# Patient Record
Sex: FEMALE | Race: WHITE | Employment: FULL TIME | URBAN - METROPOLITAN AREA
[De-identification: names, ages, dates, MRNs, and addresses within clinical notes are randomized per-mention and may not be internally consistent; named-entity substitution may affect disease eponyms.]

---

## 2017-06-02 ENCOUNTER — HOSPITAL ENCOUNTER (OUTPATIENT)
Dept: MRI IMAGING | Age: 52
Discharge: HOME | End: 2017-06-02
Attending: FAMILY MEDICINE
Payer: COMMERCIAL

## 2017-06-02 DIAGNOSIS — M54.9 BACK PAIN: ICD-10-CM

## 2017-06-02 PROCEDURE — 72148 MRI LUMBAR SPINE W/O DYE: CPT

## 2017-06-19 ENCOUNTER — OFFICE VISIT (OUTPATIENT)
Dept: OBGYN CLINIC | Age: 52
End: 2017-06-19

## 2017-06-19 VITALS — WEIGHT: 138 LBS | SYSTOLIC BLOOD PRESSURE: 118 MMHG | DIASTOLIC BLOOD PRESSURE: 76 MMHG | BODY MASS INDEX: 22.96 KG/M2

## 2017-06-19 DIAGNOSIS — D25.0 SUBMUCOUS LEIOMYOMA OF UTERUS: Primary | ICD-10-CM

## 2017-06-19 RX ORDER — VALACYCLOVIR HYDROCHLORIDE 500 MG/1
TABLET, FILM COATED ORAL 2 TIMES DAILY
COMMUNITY
End: 2021-01-06 | Stop reason: ALTCHOICE

## 2017-06-19 RX ORDER — ZOLPIDEM TARTRATE 10 MG/1
TABLET ORAL
COMMUNITY
End: 2021-01-06 | Stop reason: ALTCHOICE

## 2017-06-19 RX ORDER — CHOLECALCIFEROL (VITAMIN D3) 125 MCG
CAPSULE ORAL
COMMUNITY
End: 2019-10-03

## 2017-06-19 RX ORDER — RIZATRIPTAN BENZOATE 10 MG/1
10 TABLET ORAL
COMMUNITY
End: 2021-01-06 | Stop reason: ALTCHOICE

## 2017-06-19 RX ORDER — DOCUSATE SODIUM 100 MG/1
100 CAPSULE, LIQUID FILLED ORAL 2 TIMES DAILY
COMMUNITY
End: 2019-10-03

## 2017-06-19 NOTE — PATIENT INSTRUCTIONS
Uterine Fibroids: Care Instructions  Your Care Instructions    Uterine fibroids are growths in the uterus. Fibroids aren't cancer. Doctors don't know what causes fibroids. Fibroids are very common in women during their childbearing years. Fibroids can grow on the inside of the uterus, in the muscle wall of the uterus, or near the outside wall of the uterus. In some women, fibroids cause painful cramps and heavy periods. In these cases, taking anti-inflammatory medicines, birth control pills, or using an intrauterine device (IUD) often helps decrease symptoms. Sometimes surgery is needed to treat fibroids. But if you are near menopause, you may want to wait and see if your symptoms get better. Most fibroids shrink and go away after menopause, when your menstrual periods stop completely. Follow-up care is a key part of your treatment and safety. Be sure to make and go to all appointments, and call your doctor if you are having problems. It's also a good idea to know your test results and keep a list of the medicines you take. How can you care for yourself at home? · If your doctor gave you medicine, take it as exactly as prescribed. Call your doctor if you think you are having a problem with your medicine. · Take anti-inflammatory medicines for pain. These include ibuprofen (Advil, Motrin) and naproxen (Aleve). Read and follow all instructions on the label. · Use heat, such as a hot water bottle or a heating pad set on low, or a warm bath to relax tense muscles and relieve cramping. Put a thin cloth between the heating pad and your skin. Never go to sleep with a heating pad on. · Lie down and put a pillow under your knees. Or, lie on your side and bring your knees up to your chest. These positions may help relieve belly pain or pressure. · Keep track of how many sanitary pads or tampons you use each day. · Get at least 30 minutes of exercise on most days of the week. Walking is a good choice.  You also may want to do other activities, such as running, swimming, cycling, or playing tennis or team sports. · If you bleed longer than usual or have heavy bleeding, take a daily multivitamin with iron. When should you call for help? Call 911 anytime you think you may need emergency care. For example, call if:  · You passed out (lost consciousness). · You have sudden, severe pain in your belly or pelvis. Call your doctor now or seek immediate medical care if:  · You have severe vaginal bleeding. This means that you are soaking through your usual pads each hour for 2 or more hours. · You have new belly or pelvic pain. · You are dizzy or lightheaded, or you feel like you may faint. · You have new or unexpected vaginal bleeding. Watch closely for changes in your health, and be sure to contact your doctor if:  · You have new vaginal discharge. · You have ongoing heavy or irregular vaginal bleeding. · You have pelvic pain or a heavy feeling in your lower belly that doesn't go away. · You have to urinate often. · You are more constipated than usual.  Where can you learn more? Go to http://yomi-rosalino.info/. Enter B121 in the search box to learn more about \"Uterine Fibroids: Care Instructions. \"  Current as of: October 13, 2016  Content Version: 11.2  © 9468-2844 [employer]. Care instructions adapted under license by [employer] (which disclaims liability or warranty for this information). If you have questions about a medical condition or this instruction, always ask your healthcare professional. [place] [address] any warranty or liability for your use of this information.

## 2017-06-19 NOTE — PROGRESS NOTES
Sarah Ardon is a 46 y.o. female who complains of back pain and irregular bleeding. Patient states she is concerned the fibroids she has are causing her back pain. Considered possible Hysteroscopy, D&C with Novassure endometrial ablation last year. US 2016 w multiple fibroids; one 2.5cm submucosal and thickened endometrial lining with benign pipelle    Patient's major challenge at this point is back pain; she has seen several ortho doctors and is wondering if the fibroids could be the source of her discomfort. The back pain has significantly worsened over the past 6m but not the bleeding or cramping. She still is having a managable monthly bleed. She and I have thoroughly reviewed her current situation. I have offered her Hysteroscopy, D&C with Novassure endometrial ablation but she wants to have her back pain reviewed by another neurosurgeon. We discussed her hx of breast cancer; she has had a recent evaluation showing no evidence of recurrence. She developed this problem approximately 6 months ago. Her relevant past medical history:   Past Medical History:   Diagnosis Date    Breast cancer ([facility] [address] 2009    Cancer [facility])     left breast cancer    Radiation therapy complication         Past Surgical History:   Procedure Laterality Date    HX BREAST LUMPECTOMY Left     HX GYN          MN MASTECTOMY, PARTIAL       Social History     Occupational History    Not on file.      Social History Main Topics    Smoking status: Never Smoker    Smokeless tobacco: Not on file    Alcohol use Yes      Comment: sometimes    Drug use: Not on file    Sexual activity: Not on file     Family History   Problem Relation Age of Onset    Cancer Father      Lung    Cancer Other      father history of colon polyps    Breast Cancer Other     Breast Cancer Maternal Aunt        Allergies   Allergen Reactions    Penicillin G Other (comments)     Patient stated erythema,bruising     Prior to Admission medications    Medication Sig Start Date End Date Taking? Authorizing Provider   zolpidem (AMBIEN) 10 mg tablet Take  by mouth nightly as needed for Sleep. Yes Historical Provider   naproxen sodium 220 mg cap Take  by mouth. Yes Historical Provider   docusate sodium (COLACE) 100 mg capsule Take 100 mg by mouth two (2) times a day. Yes Historical Provider   loratadine 10 mg cap Take  by mouth. Yes Historical Provider   valACYclovir (VALTREX) 500 mg tablet Take  by mouth two (2) times a day. Yes Historical Provider   rizatriptan (MAXALT) 10 mg tablet Take 10 mg by mouth once as needed for Migraine. May repeat in 2 hours if needed   Yes Historical Provider   cholecalciferol, vitamin D3, (VITAMIN D3) 2,000 unit tab Take 2,000 Units by mouth daily. Yes Historical Provider   ibuprofen (MOTRIN) 400 mg tablet Take 400 mg by mouth nightly. Historical Provider   OTHER Take 10 mg by mouth daily.  Indications: Losartan    Historical Provider        Review of Systems - History obtained from the patient  Constitutional: negative for weight loss, fever, night sweats  HEENT: negative for hearing loss, earache, congestion, snoring, sorethroat  CV: negative for chest pain, palpitations, edema  Resp: negative for cough, shortness of breath, wheezing  Breast: negative for breast lumps, nipple discharge, galactorrhea  GI: negative for change in bowel habits, abdominal pain, black or bloody stools  : negative for frequency, dysuria, hematuria  MSK: negative for back pain, joint pain, muscle pain  Skin: negative for itching, rash, hives  Neuro: negative for dizziness, headache, confusion, weakness  Psych: negative for anxiety, depression, change in mood  Heme/lymph: negative for bleeding, bruising, pallor      Objective:  Visit Vitals    /76    Wt 138 lb (62.6 kg)    LMP 06/09/2017 (Exact Date)    BMI 22.96 kg/m2          PHYSICAL EXAMINATION    Constitutional  · Appearance: well-nourished, well developed, alert, in no acute distress    HENT  · Head and Face: appears normal    ·    ·      Genitourinary  · deferred    Skin  · General Inspection: no rash, no lesions identified    Neurologic/Psychiatric  · Mental Status:  · Orientation: grossly oriented to person, place and time  · Mood and Affect: mood normal, affect appropriate    Assessment:    progressively worsening back pain of unclear etiology  Fibroids - only minimal increase in size noted on recent study 12/2016  Heavy menses; negative pipelle 12/2016  Hx breast cancer; 2009; no evidence of recurrence  Plan:    At this point, patient to pursue 2nd opinion regarding her back w physician in [place]  Will update me if she wants to pursue Hysteroscopy, D&C with Novassure endometrial ablation; understands that US and pipelle need to be repeated if >1 year from current studies  Not a candidate for hormonal intervention due to hx breast cancer

## 2017-07-28 ENCOUNTER — HOSPITAL ENCOUNTER (OUTPATIENT)
Dept: PHYSICAL THERAPY | Age: 52
Discharge: HOME | End: 2017-07-28
Payer: COMMERCIAL

## 2017-07-28 PROCEDURE — 97162 PT EVAL MOD COMPLEX 30 MIN: CPT | Performed by: PHYSICAL THERAPIST

## 2017-07-28 PROCEDURE — 97140 MANUAL THERAPY 1/> REGIONS: CPT | Performed by: PHYSICAL THERAPIST

## 2017-07-28 NOTE — PROGRESS NOTES
[facility]  [address]  [place], [address]  Phone: [phone]  Fax: [phone]    Plan of Care/Statement of Necessity for Physical Therapy Services  2-15    Patient name: Wanda Granado  : 1965  Provider#: [id number]  Referral source: Eric Ortez DO      Medical/Treatment Diagnosis: Low back pain [M54.5]     Prior Hospitalization: see medical history     Comorbidities: see evaluation  Prior Level of Function:see evaluation  Medications: Verified on Patient Summary List  Start of Care: 2017     Onset Date:see evaluation   The Plan of Care and following information is based on the information from the initial evaluation. Assessment/ key information: Patient presents with signs and symptoms consistent with destiny lumbar radiculopathy and will benefit from physical therapy to address deficits noted below in problem list.     Evaluation Complexity History MEDIUM  Complexity : 1-2 comorbidities / personal factors will impact the outcome/ POC ; Examination MEDIUM Complexity : 3 Standardized tests and measures addressing body structure, function, activity limitation and / or participation in recreation  ;Presentation MEDIUM Complexity : Evolving with changing characteristics  ; Clinical Decision Making MEDIUM Complexity : FOTO score of 26-74  Overall Complexity Rating: MEDIUM    Problem List: pain affecting function, decrease strength, decrease ADL/ functional abilitiies, decrease activity tolerance and other difficulty sleeping   Treatment Plan may include any combination of the following: Therapeutic exercise, Therapeutic activities, Neuromuscular re-education, Physical agent/modality, Manual therapy, Patient education and Self Care training  Patient / Family readiness to learn indicated by: asking questions, trying to perform skills and interest  Persons(s) to be included in education: patient (P)  Barriers to Learning/Limitations: None  Patient Goal (s): please see evaluation in [facility]  Patient Self Reported Health Status: please see paper chart  Rehabilitation Potential: good    Short Term Goals: To be accomplished in 5 treatments:  -Independent in HEP as evidenced on ability to perform at least 5 exercises from HEP using proper form without verbal cuing.   -Pain less than or equal to 3.5/10 at worst to allow patient to perform ADL's with greater ease  -Demostrate proper posture in order to decrease lumbar pain  -Pt will report compliance with icing 1-2x/day in order to decrease inflammation    Long Term Goals: To be accomplished in 10 treatments:  -Radicular symptoms with sleeping improved 50% to allow pt to get more restful sleep  -MMT 5/5 all planes to allow patient to perform ADL's  -Pain 0/10 to allow patient to establish gym workout routine  -FOTO score greater than or equal to 68 to allow patient to perform a greater amount of ADLs. Frequency / Duration: Patient to be seen 2 times per week for 8-10 weeks. Patient/ Caregiver education and instruction: self care, activity modification and exercises    [x]  Plan of care has been reviewed with PTA    Certification Period: 7/28/2017 -  10/27/17    Femicalin Gallegos. Vanessa, PT, DPT, CMTPT      5/06/1402 17:27 AM  PT License Number: [id number]  _____________________________________________________________________    I certify that the above Therapy Services are being furnished while the patient is under my care. I agree with the treatment plan and certify that this therapy is necessary.     [de-identified] Signature:____________________  Date:____________Time:_________

## 2017-07-28 NOTE — PROGRESS NOTES
PT INITIAL EVALUATION NOTE - [facility] 2-15    Patient Name: Yaz Grayson  Date:2017  : 1965  [x]  Patient  Verified  Payor: Kwaku Andrade / Plan: Elbert Shore / Product Type: PPO /    In time:1045  Out time:1155  Total Treatment Time (min): 70  Total Timed Codes (min): 70 (50 eval, 20 timed see below)  Visit #: 1     Treatment Area: Low back pain [M54.5]    SUBJECTIVE  Any medication changes, allergies to medications, adverse drug reactions, diagnosis change, or new procedure performed?: [] No    [x] Yes (see summary sheet for update)  Date of onset/injury: 2-3 years. Denies any GURMEET. Pain started off just as (R) hip pain only with exercise her MD/PT determined it was coming from her scoliosis  Then, began developing leg pain at night  Recently, intensity of leg pain has increased, which sought consult with Dr. Karthik Carvajal to Dr. Latisha Gastelum, who advised she try PT. Pain:   5 (tingling)/10 max 0/10 min 1/10 now     Location of symptoms: low back, (R) hip (dull achy pain-\"not what brought me in here\")  down destiny LE's (L>R) which is described as tingling/numb  Aggravated by: sitting for prolonged period of time, going to bed at night (supine for >30 min), unable to lift leg up very well   Eased by: standing   Prior tests/injections: injection 2016-improved sleeping at night. Pain returned 2017. MD did not want to do another injection as he said pt's insurance \"wouldn't cover it\"   PMH:  Scoliosis, fibroids (OBGYN states she does not believe fibroids are contributing to LBP.)  Pt reporting menstrual cycle does not correlate with LBP  Any weakness in LE's: YES- difficuly lifting leg.   Denies sensation of leg giving out on her  Any tingling/numbness in LE's: Tingling destiny-post aspect of HS, radiates to calf-ONLY AT NIGHT when trying to fall asleep (pt reports it's definitely when she's supine, unsure if she gets it when she's sidelying  Recent weight/loss or weight gain: none  Denies changes in bowel/bladder function. Denies pain awakening her at night  Prior tx: PT in 2009 for (R) hip-after some tx, PT/MD's determined it was coming from her scoliosis  MRI: most recent MRI shows: Increased levoscoliosis of the upper lumbar spine since 2012. Increased mild  central spinal canal stenosis at L1-2. Otherwise, unchanged stenoses, including  moderate right foraminal stenosis at L2-3. Occupation: [employer] often to [facility], which causes some increased pain  Prior level of function/activity level: relatively sedentary. No formal ex program established. Busy with Family & [employer] activities. Patient goal: \"to be able to exercise\" -pt states she's at a point in her life where she wants to establish an ex program    OBJECTIVE    Observation: S scoliotic curve  Lumbar shift  none  Lordosis   [] Inc    [x] Dec  Pelvic symmetry  [x] WNL  [] Other:   Decreased lordosis    Gait: WNL    Lumbar AROM full all planes    Strength   L(0-5) R (0-5)   Hip Flexion (L1,2) 4+ 3+ p! Knee Extension (L3,4) 4+ 4+ p! Knee Flexion (S1,2) 5 5   Ankle Dorsiflexion (L4) 5 5   Great Toe Extension (L5) 5 5   Ankle Plantarflexion (S1) 5 5   Ankle Eversion (S1) 5 5   Lower Abdominals 3 3   Paraspinals 3 3   Hip Extensors NT NT   Hip Abductors NT NT   Hip ER's NT NT   Hip IR's NT NT     Tenderness to palpation:  (R) lumbar paraspinals, glute med, glute max (*glute max referring pain to pt's low back pain location)    Special Tests: (-) SLR, (-) slump     Flexibility Deficits: WNL destiny LE    Joint mobility:  Left L4/5 facet is flexed, side bent, and rotated to the left (FRS) when spine is in extension      Outcome Measure: Using standardized self-reported disability survey (Focus on Therapeutic Outcomes) the patient's perceived disability score is 61 - zero is the most disabled and 100 is the least disabled.       OBJECTIVE    Modality rationale: decrease inflammation, decrease pain and prevent soreness to improve the patients ability to perform ADL's. Min Type Additional Details    [x]  Ice     []  Heat  Denied as pt has another appt afterwards she must get to      [x] Skin assessment post-treatment:  [x]intact []redness- no adverse reaction    []redness  adverse reaction:       20 min Manual Therapy:   MET for FRS left L4/5 (improved position but not totally symmetrical)  MFR (R) glute max, glute med   Rationale: decrease pain, increase ROM, increase tissue extensibility and decrease trigger points to improve the patients ability to sleep          With   [] TE   [] TA   [] neuro   [x] manual: Patient Education: [x] Review HEP    [] Progressed/Changed HEP based on:   [] positioning   [] body mechanics   [] transfers   [x] Ice application- pt advised to ice 10-15 min 1-2 x/day to area in order to dec inflammation either supine with LE supported or prone with two pillows under hips  [x] other:  re: mechanism of injury/condition, role of physical therapy, prognosis for recovery, heat vs ice, activity modifications. Education re: paying attention to sleeping position. Advised to try to sleep in SL with LE flexed and see if tingling is improved. Pain Level (0-10 scale) post treatment: 0    ASSESSMENT/Changes in Function:     [x]  See Plan of Alex.  Vanessa PT, JUANAT, CMTPT  PT License Number: [id number]   7/28/2017  10:40 AM

## 2017-08-03 ENCOUNTER — HOSPITAL ENCOUNTER (OUTPATIENT)
Dept: PHYSICAL THERAPY | Age: 52
Discharge: HOME | End: 2017-08-03
Payer: COMMERCIAL

## 2017-08-03 PROCEDURE — 97110 THERAPEUTIC EXERCISES: CPT | Performed by: PHYSICAL THERAPIST

## 2017-08-03 PROCEDURE — 97140 MANUAL THERAPY 1/> REGIONS: CPT | Performed by: PHYSICAL THERAPIST

## 2017-08-03 NOTE — PROGRESS NOTES
PT DAILY TREATMENT NOTE 2-15    Patient Name: Fransisco Mendieta  Date:8/3/2017  : 1965  [x]  Patient  Verified  Payor: Kuldiplópez Lozas / Plan: Stan Hawkins / Product Type: PPO /    In time: 7:30 AM  Out time: 8:40 AM  Total Treatment Time (min): 70  Visit #: 2     Treatment Area: Low back pain [M54.5]    SUBJECTIVE  Pain Level (0-10 scale): 2  Any medication changes, allergies to medications, adverse drug reactions, diagnosis change, or new procedure performed?: [x] No    [] Yes (see summary sheet for update)  Subjective functional status/changes:   [] No changes reported  \"My legs hurt me the other night. I had a hard time sleeping. \"    OBJECTIVE    Modality rationale: decrease inflammation and decrease pain to improve the patients ability to sleep   Min Type Additional Details    [] Estim: []Att   []Unatt        []TENS instruct                  []IFC  []Premod   []NMES                     []Other:  []w/US   []w/ice   []w/heat  Position:  Location:    []  Traction: [] Cervical       []Lumbar                       [] Prone          []Supine                       []Intermittent   []Continuous Lbs:  [] before manual  [] after manual  []w/heat    []  Ultrasound: []Continuous   [] Pulsed at:                            []1MHz   []3MHz Location:  W/cm2:    []  Paraffin         Location:  []w/heat    []  Ice     []  Heat  []  Ice massage Position:  Location:    []  Laser  []  Other: Position:  Location:    []  Vasopneumatic Device Pressure:       [] lo [] med [] hi   Temperature:    [x] Skin assessment post-treatment:  [x]intact []redness- no adverse reaction    []redness  adverse reaction:     30 min Therapeutic Exercise:  [x] See flow sheet :  Added recumbent elliptical, ab brace, LTR, and piriformis stretch.    Rationale: increase ROM and increase strength to improve the patients ability to sleep    30 min Manual Therapy:    Trigger point release B gluteus medius and piriformis muscles  STM right lumbar paraspinals  Manual right L4/5 facet stretch in left side lying   Rationale: decrease pain, increase ROM, increase tissue extensibility and decrease trigger points  to improve the patients ability to sleep. With   [x] TE   [] TA   [] neuro   [] other: Patient Education: [x] Review HEP/written handout    [] Progressed/Changed HEP based on:   [] positioning   [] body mechanics   [] transfers   [] heat/ice application    [] other:      Other Objective/Functional Measures:      Pain Level (0-10 scale) post treatment: 0    ASSESSMENT/Changes in Function:  Gluteal trigger points and lumbar stiffness may be causing leg symptoms. Patient will continue to benefit from skilled PT services to modify and progress therapeutic interventions, address functional mobility deficits, address ROM deficits, address strength deficits, analyze and address soft tissue restrictions, analyze and cue movement patterns, analyze and modify body mechanics/ergonomics and assess and modify postural abnormalities to attain remaining goals.      []  See Plan of Care  []  See progress note/recertification  []  See Discharge Summary         Progress towards goals / Updated goals:  nt    PLAN  [x]  Upgrade activities as tolerated     [x]  Continue plan of care  []  Update interventions per flow sheet       []  Discharge due to:_  []  Other:_      Balta Brunson, PT 8/3/2017  7:29 AM

## 2017-08-10 ENCOUNTER — HOSPITAL ENCOUNTER (OUTPATIENT)
Dept: PHYSICAL THERAPY | Age: 52
Discharge: HOME | End: 2017-08-10
Payer: COMMERCIAL

## 2017-08-10 PROCEDURE — 97140 MANUAL THERAPY 1/> REGIONS: CPT | Performed by: PHYSICAL THERAPIST

## 2017-08-10 PROCEDURE — 97110 THERAPEUTIC EXERCISES: CPT | Performed by: PHYSICAL THERAPIST

## 2017-08-10 NOTE — PROGRESS NOTES
PT DAILY TREATMENT NOTE 2-15    Patient Name: Balwinder Ruiz  Date:8/10/2017  : 1965  [x]  Patient  Verified  Payor: Kim Woodard / Plan: Lakeisha Le / Product Type: PPO /    In time: 8:00 AM  Out time: 9:05 AM  Total Treatment Time (min): 65  Visit #: 3    Treatment Area: Low back pain [M54.5]    SUBJECTIVE  Pain Level (0-10 scale): 0  Any medication changes, allergies to medications, adverse drug reactions, diagnosis change, or new procedure performed?: [x] No    [] Yes (see summary sheet for update)  Subjective functional status/changes:   [] No changes reported  States felt good after last time. Able to sleep on her right side now. Hasn't felt it in her legs as much, but feeling it more in her back when she drives in the car. \"  States feels like her right side needs to \"pop. \"     OBJECTIVE  Right malleolus inferior  Right ASIS and iliac crest low  Possible right anterior innominate    Modality rationale: decrease inflammation and decrease pain to improve the patients ability to sleep   Min Type Additional Details    [] Estim: []Att   []Unatt        []TENS instruct                  []IFC  []Premod   []NMES                     []Other:  []w/US   []w/ice   []w/heat  Position:  Location:    []  Traction: [] Cervical       []Lumbar                       [] Prone          []Supine                       []Intermittent   []Continuous Lbs:  [] before manual  [] after manual  []w/heat    []  Ultrasound: []Continuous   [] Pulsed at:                            []1MHz   []3MHz Location:  W/cm2:    []  Paraffin         Location:  []w/heat   10 [x]  Ice     []  Heat  []  Ice massage Position:  Hook lying  Location:  Low back    []  Laser  []  Other: Position:  Location:    []  Vasopneumatic Device Pressure:       [] lo [] med [] hi   Temperature:    [x] Skin assessment post-treatment:  [x]intact []redness- no adverse reaction    []redness  adverse reaction:     30 min Therapeutic Exercise: [x] See flow sheet :  Added recumbent elliptical, ab brace, LTR, and piriformis stretch. Rationale: increase ROM and increase strength to improve the patients ability to sleep    20 min Manual Therapy:    MET with right hamstrings for possible right anterior innominate  Trigger point release B gluteus medius and piriformis muscles  Manual right L4/5 facet stretch in left side lying   Rationale: decrease pain, increase ROM, increase tissue extensibility and decrease trigger points  to improve the patients ability to sleep. With   [x] TE   [] TA   [] neuro   [] other: Patient Education: [x] Review HEP/written handout    [] Progressed/Changed HEP based on:   [] positioning   [] body mechanics   [] transfers   [] heat/ice application    [] other:      Other Objective/Functional Measures:      Pain Level (0-10 scale) post treatment: 0    ASSESSMENT/Changes in Function:  Right SI dysfunction noted. Pelvic symmetry after manual therapy. Patient will continue to benefit from skilled PT services to modify and progress therapeutic interventions, address functional mobility deficits, address ROM deficits, address strength deficits, analyze and address soft tissue restrictions, analyze and cue movement patterns, analyze and modify body mechanics/ergonomics and assess and modify postural abnormalities to attain remaining goals.      []  See Plan of Care  []  See progress note/recertification  []  See Discharge Summary         Progress towards goals / Updated goals:  nt    PLAN  [x]  Upgrade activities as tolerated     [x]  Continue plan of care  []  Update interventions per flow sheet       []  Discharge due to:_  []  Other:_      Farhana Justice, PT 8/10/2017  8:17 AM

## 2017-08-15 ENCOUNTER — HOSPITAL ENCOUNTER (OUTPATIENT)
Dept: PHYSICAL THERAPY | Age: 52
Discharge: HOME | End: 2017-08-15
Payer: COMMERCIAL

## 2017-08-15 PROCEDURE — 97140 MANUAL THERAPY 1/> REGIONS: CPT | Performed by: PHYSICAL THERAPIST

## 2017-08-15 PROCEDURE — 97110 THERAPEUTIC EXERCISES: CPT | Performed by: PHYSICAL THERAPIST

## 2017-08-15 NOTE — PROGRESS NOTES
PT DAILY TREATMENT NOTE 2-15    Patient Name: Jose Ovalles  Date:8/15/2017  : 1965  [x]  Patient  Verified  Payor: Kristen Moeller / Plan: [place] / Product Type: PPO /    In time: 8:00 AM  Out time: 9:05 AM  Total Treatment Time (min): 65  Visit #: 4    Treatment Area: Low back pain [M54.5]    SUBJECTIVE  Pain Level (0-10 scale): 0  Any medication changes, allergies to medications, adverse drug reactions, diagnosis change, or new procedure performed?: [x] No    [] Yes (see summary sheet for update)  Subjective functional status/changes:   [] No changes reported  States felt better after last visit, but then painful the next day after driving for work.     OBJECTIVE  ASIS landmarks =  Severe tenderness right quadratus lumborum muscle    Modality rationale: decrease inflammation and decrease pain to improve the patients ability to sleep   Min Type Additional Details    [] Estim: []Att   []Unatt        []TENS instruct                  []IFC  []Premod   []NMES                     []Other:  []w/US   []w/ice   []w/heat  Position:  Location:    []  Traction: [] Cervical       []Lumbar                       [] Prone          []Supine                       []Intermittent   []Continuous Lbs:  [] before manual  [] after manual  []w/heat    []  Ultrasound: []Continuous   [] Pulsed at:                            []1MHz   []3MHz Location:  W/cm2:    []  Paraffin         Location:  []w/heat   10 [x]  Ice     []  Heat  []  Ice massage Position:  Hook lying  Location:  Low back    []  Laser  []  Other: Position:  Location:    []  Vasopneumatic Device Pressure:       [] lo [] med [] hi   Temperature:    [x] Skin assessment post-treatment:  [x]intact []redness- no adverse reaction    []redness  adverse reaction:     30 min Therapeutic Exercise:  [x] See flow sheet :     Rationale: increase ROM and increase strength to improve the patients ability to sleep    20 min Manual Therapy:    Trigger point release B gluteus medius and piriformis muscles omit  STM right QL muscle  Manual right L4/5 facet stretch in left side lying   Rationale: decrease pain, increase ROM, increase tissue extensibility and decrease trigger points  to improve the patients ability to sleep. With   [x] TE   [] TA   [] neuro   [] other: Patient Education: [x] Review HEP/written handout    [] Progressed/Changed HEP based on:   [] positioning   [] body mechanics   [] transfers   [] heat/ice application    [] other:      Other Objective/Functional Measures:      Pain Level (0-10 scale) post treatment: 0    ASSESSMENT/Changes in Function:  Decreased QL tenderness, but general soreness after manual therapy. Patient will continue to benefit from skilled PT services to modify and progress therapeutic interventions, address functional mobility deficits, address ROM deficits, address strength deficits, analyze and address soft tissue restrictions, analyze and cue movement patterns, analyze and modify body mechanics/ergonomics and assess and modify postural abnormalities to attain remaining goals.      []  See Plan of Care  []  See progress note/recertification  []  See Discharge Summary         Progress towards goals / Updated goals:  nt    PLAN  [x]  Upgrade activities as tolerated     [x]  Continue plan of care  []  Update interventions per flow sheet       []  Discharge due to:_  []  Other:_      Hollie Kilpatrick PT 8/15/2017  8:17 AM

## 2017-08-18 ENCOUNTER — APPOINTMENT (OUTPATIENT)
Dept: PHYSICAL THERAPY | Age: 52
End: 2017-08-18
Payer: COMMERCIAL

## 2017-08-25 ENCOUNTER — HOSPITAL ENCOUNTER (OUTPATIENT)
Dept: PHYSICAL THERAPY | Age: 52
Discharge: HOME | End: 2017-08-25
Payer: COMMERCIAL

## 2017-08-25 PROCEDURE — 97110 THERAPEUTIC EXERCISES: CPT | Performed by: PHYSICAL THERAPIST

## 2017-08-25 PROCEDURE — 97140 MANUAL THERAPY 1/> REGIONS: CPT | Performed by: PHYSICAL THERAPIST

## 2017-08-28 ENCOUNTER — APPOINTMENT (OUTPATIENT)
Dept: PHYSICAL THERAPY | Age: 52
End: 2017-08-28
Payer: COMMERCIAL

## 2017-09-11 ENCOUNTER — HOSPITAL ENCOUNTER (OUTPATIENT)
Dept: PHYSICAL THERAPY | Age: 52
Discharge: HOME | End: 2017-09-11
Payer: COMMERCIAL

## 2017-09-11 PROCEDURE — 97110 THERAPEUTIC EXERCISES: CPT | Performed by: PHYSICAL THERAPIST

## 2017-09-11 PROCEDURE — 97140 MANUAL THERAPY 1/> REGIONS: CPT | Performed by: PHYSICAL THERAPIST

## 2017-09-11 NOTE — PROGRESS NOTES
PT DAILY TREATMENT NOTE 2-15    Patient Name: Gerry Sandoval  Date:2017  : 1965  [x]  Patient  Verified  Payor: Jeffrey Godinez / Plan: Ernie Expose / Product Type: PPO /    In time: 10:55 AM  Out time:  12:00 PM  Total Treatment Time (min): 65  Visit #: 6    Treatment Area: Low back pain [M54.5]    SUBJECTIVE  Pain Level (0-10 scale): 4  Any medication changes, allergies to medications, adverse drug reactions, diagnosis change, or new procedure performed?: [x] No    [] Yes (see summary sheet for update)  Subjective functional status/changes:   [] No changes reported  States increased pain the past few days. Complains of right sided LBP. States bridges cause increased pain.     OBJECTIVE  Right anterior innominate  + right standing flexion test  FRS left L4/5 and L5/S1    Modality rationale: decrease inflammation and decrease pain to improve the patients ability to sleep   Min Type Additional Details    [] Estim: []Att   []Unatt        []TENS instruct                  []IFC  []Premod   []NMES                     []Other:  []w/US   []w/ice   []w/heat  Position:  Location:    []  Traction: [] Cervical       []Lumbar                       [] Prone          []Supine                       []Intermittent   []Continuous Lbs:  [] before manual  [] after manual  []w/heat    []  Ultrasound: []Continuous   [] Pulsed at:                            []1MHz   []3MHz Location:  W/cm2:    []  Paraffin         Location:  []w/heat   10 [x]  Ice     []  Heat OMIT per pt request as she has to go back to work  []  Ice massage Position:  Hook lying  Location:  Low back    []  Laser  []  Other: Position:  Location:    []  Vasopneumatic Device Pressure:       [] lo [] med [] hi   Temperature:    [x] Skin assessment post-treatment:  [x]intact []redness- no adverse reaction    []redness  adverse reaction:     30 min Therapeutic Exercise:  [x] See flow sheet :     Rationale: increase ROM and increase strength to improve the patients ability to sleep    20 min Manual Therapy:    Trigger point release B gluteus medius and piriformis muscles  MET with right hamstring for possible right anterior innominate  MET FRS left L4/5, L5/S1   Rationale: decrease pain, increase ROM, increase tissue extensibility and decrease trigger points  to improve the patients ability to sleep. With   [x] TE   [] TA   [] neuro   [] other: Patient Education: [x] Review HEP/written handout    [] Progressed/Changed HEP based on:   [] positioning   [] body mechanics   [] transfers   [] heat/ice application    [x] other: Decreased height of bridges to avoid lumbar extension position. Other Objective/Functional Measures:      Pain Level (0-10 scale) post treatment: 2    ASSESSMENT/Changes in Function:  Mechanical dysfunction causing back pain. Decreased pain with manual therapy. Patient will continue to benefit from skilled PT services to modify and progress therapeutic interventions, address functional mobility deficits, address ROM deficits, address strength deficits, analyze and address soft tissue restrictions, analyze and cue movement patterns, analyze and modify body mechanics/ergonomics and assess and modify postural abnormalities to attain remaining goals.      []  See Plan of Care  []  See progress note/recertification  []  See Discharge Summary         Progress towards goals / Updated goals:    PLAN  [x]  Upgrade activities as tolerated     [x]  Continue plan of care  []  Update interventions per flow sheet       []  Discharge due to:_  []  Other:_      Rachael Randhawa, PT 9/11/2017  8:17 AM

## 2017-09-21 ENCOUNTER — HOSPITAL ENCOUNTER (OUTPATIENT)
Dept: PHYSICAL THERAPY | Age: 52
Discharge: HOME | End: 2017-09-21
Payer: COMMERCIAL

## 2017-09-21 PROCEDURE — 97140 MANUAL THERAPY 1/> REGIONS: CPT | Performed by: PHYSICAL THERAPIST

## 2017-09-21 PROCEDURE — 97110 THERAPEUTIC EXERCISES: CPT | Performed by: PHYSICAL THERAPIST

## 2017-09-21 NOTE — PROGRESS NOTES
PT DAILY TREATMENT NOTE 2-15    Patient Name: Kayley Rice  Date:2017  : 1965  [x]  Patient  Verified  Payor: Ezra Buck / Plan: Kurt Poster / Product Type: PPO /    In time: 7:30 AM  Out time:  8:25 AM  Total Treatment Time (min): 55  Visit #: 7    Treatment Area: Low back pain [M54.5]    SUBJECTIVE  Pain Level (0-10 scale): 4  Any medication changes, allergies to medications, adverse drug reactions, diagnosis change, or new procedure performed?: [x] No    [] Yes (see summary sheet for update)  Subjective functional status/changes:   [] No changes reported  \"I have an injection scheduled. It feels better when I leave here, but then the pain comes back. It hurts a lot when I sit and drive. \"    OBJECTIVE  Right anterior innominate  FRS left L2/3    Modality rationale: decrease inflammation and decrease pain to improve the patients ability to sleep   Min Type Additional Details    [] Estim: []Att   []Unatt        []TENS instruct                  []IFC  []Premod   []NMES                     []Other:  []w/US   []w/ice   []w/heat  Position:  Location:    []  Traction: [] Cervical       []Lumbar                       [] Prone          []Supine                       []Intermittent   []Continuous Lbs:  [] before manual  [] after manual  []w/heat    []  Ultrasound: []Continuous   [] Pulsed at:                            []1MHz   []3MHz Location:  W/cm2:    []  Paraffin         Location:  []w/heat    [x]  Ice     []  Heat   []  Ice massage Position:  Hook lying  Location:  Low back    []  Laser  []  Other: Position:  Location:    []  Vasopneumatic Device Pressure:       [] lo [] med [] hi   Temperature:    [x] Skin assessment post-treatment:  [x]intact []redness- no adverse reaction    []redness  adverse reaction:     30 min Therapeutic Exercise:  [x] See flow sheet :     Rationale: increase ROM and increase strength to improve the patients ability to sleep    20 min Manual Therapy:    Trigger point release B gluteus medius and piriformis muscles (omit)  MET with right hamstring for possible right anterior innominate  MET FRS left L4/5, L5/S1   Rationale: decrease pain, increase ROM, increase tissue extensibility and decrease trigger points  to improve the patients ability to sleep. With   [x] TE   [] TA   [] neuro   [] other: Patient Education: [x] Review HEP/written handout    [] Progressed/Changed HEP based on:   [] positioning   [] body mechanics   [] transfers   [] heat/ice application    [x] other: Decreased height of bridges to avoid lumbar extension position. Other Objective/Functional Measures:      Pain Level (0-10 scale) post treatment: 3    ASSESSMENT/Changes in Function:   SI and lumbar mechanical findings. Improves with manual therapy. Patient will continue to benefit from skilled PT services to modify and progress therapeutic interventions, address functional mobility deficits, address ROM deficits, address strength deficits, analyze and address soft tissue restrictions, analyze and cue movement patterns, analyze and modify body mechanics/ergonomics and assess and modify postural abnormalities to attain remaining goals.      []  See Plan of Care  []  See progress note/recertification  []  See Discharge Summary         Progress towards goals / Updated goals:    PLAN  [x]  Upgrade activities as tolerated     [x]  Continue plan of care  []  Update interventions per flow sheet       []  Discharge due to:_  []  Other:_      Naoh Crawford PT 9/21/2017  8:18 AM

## 2017-09-27 ENCOUNTER — HOSPITAL ENCOUNTER (OUTPATIENT)
Dept: PHYSICAL THERAPY | Age: 52
Discharge: HOME | End: 2017-09-27
Payer: COMMERCIAL

## 2017-09-27 PROCEDURE — 97140 MANUAL THERAPY 1/> REGIONS: CPT | Performed by: PHYSICAL THERAPIST

## 2017-09-27 PROCEDURE — 97110 THERAPEUTIC EXERCISES: CPT | Performed by: PHYSICAL THERAPIST

## 2017-09-27 NOTE — PROGRESS NOTES
PT DAILY TREATMENT NOTE 2-15    Patient Name: Mckenzie Rosenbaum  Date:2017  : 1965  [x]  Patient  Verified  Payor: Gayathri Gage / Plan: Sarwat Mcpherson / Product Type: PPO /    In time: 10:00 AM  Out time:  10:55 AM  Total Treatment Time (min): 55  Visit #: 8    Treatment Area: Low back pain [M54.5]    SUBJECTIVE  Pain Level (0-10 scale): 3  Any medication changes, allergies to medications, adverse drug reactions, diagnosis change, or new procedure performed?: [x] No    [] Yes (see summary sheet for update)  Subjective functional status/changes:   [] No changes reported  \"It hasn't been a great week, it's been sore. \"    OBJECTIVE  ASIS landmarks appear symmetrical  FRS left L2/3, L3/4, L4/5    Modality rationale: decrease inflammation and decrease pain to improve the patients ability to sleep   Min Type Additional Details    [] Estim: []Att   []Unatt        []TENS instruct                  []IFC  []Premod   []NMES                     []Other:  []w/US   []w/ice   []w/heat  Position:  Location:    []  Traction: [] Cervical       []Lumbar                       [] Prone          []Supine                       []Intermittent   []Continuous Lbs:  [] before manual  [] after manual  []w/heat    []  Ultrasound: []Continuous   [] Pulsed at:                            []1MHz   []3MHz Location:  W/cm2:    []  Paraffin         Location:  []w/heat   10 [x]  Ice     []  Heat   []  Ice massage Position:  Hook lying  Location:  Low back    []  Laser  []  Other: Position:  Location:    []  Vasopneumatic Device Pressure:       [] lo [] med [] hi   Temperature:    [x] Skin assessment post-treatment:  [x]intact []redness- no adverse reaction    []redness  adverse reaction:     30 min Therapeutic Exercise:  [x] See flow sheet :     Rationale: increase ROM and increase strength to improve the patients ability to sleep    15 min Manual Therapy:    MET FRS left L4/5, L3/4   Rationale: decrease pain, increase ROM, increase tissue extensibility and decrease trigger points  to improve the patients ability to sleep. With   [x] TE   [] TA   [] neuro   [] other: Patient Education: [x] Review HEP/written handout    [] Progressed/Changed HEP based on:   [] positioning   [] body mechanics   [] transfers   [] heat/ice application    [x] other: Decreased height of bridges to avoid lumbar extension position. Other Objective/Functional Measures:      Pain Level (0-10 scale) post treatment: 1    ASSESSMENT/Changes in Function:  Multi level lumbar rotation mechanical dysfunction noted. Improved lumbar positional symmetry with manual therapy. Patient will continue to benefit from skilled PT services to modify and progress therapeutic interventions, address functional mobility deficits, address ROM deficits, address strength deficits, analyze and address soft tissue restrictions, analyze and cue movement patterns, analyze and modify body mechanics/ergonomics and assess and modify postural abnormalities to attain remaining goals. []  See Plan of Care  []  See progress note/recertification  []  See Discharge Summary         Progress towards goals / Updated goals:    PLAN  [x]  Upgrade activities as tolerated     [x]  Continue plan of care  []  Update interventions per flow sheet       []  Discharge due to:_  [x]  Having lumbar injection tomorrow.     Shirley Cheatham V, PT 9/27/2017  10:41 AM

## 2017-09-30 ENCOUNTER — HOSPITAL ENCOUNTER (OUTPATIENT)
Dept: MAMMOGRAPHY | Age: 52
Discharge: HOME | End: 2017-09-30
Attending: FAMILY MEDICINE
Payer: COMMERCIAL

## 2017-09-30 DIAGNOSIS — Z12.31 VISIT FOR SCREENING MAMMOGRAM: ICD-10-CM

## 2017-09-30 PROCEDURE — 77063 BREAST TOMOSYNTHESIS BI: CPT

## 2017-10-02 ENCOUNTER — HOSPITAL ENCOUNTER (OUTPATIENT)
Dept: PHYSICAL THERAPY | Age: 52
Discharge: HOME | End: 2017-10-02
Payer: COMMERCIAL

## 2017-10-02 PROCEDURE — 97110 THERAPEUTIC EXERCISES: CPT | Performed by: PHYSICAL THERAPIST

## 2017-10-02 PROCEDURE — 97140 MANUAL THERAPY 1/> REGIONS: CPT | Performed by: PHYSICAL THERAPIST

## 2017-10-02 NOTE — PROGRESS NOTES
PT DAILY TREATMENT NOTE 2-15    Patient Name: Pavithra Mcgowan  Date:10/2/2017  : 1965  [x]  Patient  Verified  Payor: Celine Faustin / Plan: Jinny Diamond / Product Type: PPO /    In time: 7:30 AM  Out time: 8:20 AM  Total Treatment Time (min): 50  Visit #: 9    Treatment Area: Low back pain [M54.5]    SUBJECTIVE  Pain Level (0-10 scale): 0  Any medication changes, allergies to medications, adverse drug reactions, diagnosis change, or new procedure performed?: [x] No    [] Yes (see summary sheet for update)  Subjective functional status/changes:   [] No changes reported  \"I had an injection on Friday, I feel great. \"    OBJECTIVE  Slight right anterior innominate  FRS left L3/4, L4/5    Modality rationale: decrease inflammation and decrease pain to improve the patients ability to sleep   Min Type Additional Details    [] Estim: []Att   []Unatt        []TENS instruct                  []IFC  []Premod   []NMES                     []Other:  []w/US   []w/ice   []w/heat  Position:  Location:    []  Traction: [] Cervical       []Lumbar                       [] Prone          []Supine                       []Intermittent   []Continuous Lbs:  [] before manual  [] after manual  []w/heat    []  Ultrasound: []Continuous   [] Pulsed at:                            []1MHz   []3MHz Location:  W/cm2:    []  Paraffin         Location:  []w/heat    [x]  Ice     []  Heat   []  Ice massage Position:  Hook lying  Location:  Low back    []  Laser  []  Other: Position:  Location:    []  Vasopneumatic Device Pressure:       [] lo [] med [] hi   Temperature:    [x] Skin assessment post-treatment:  [x]intact []redness- no adverse reaction    []redness  adverse reaction:     30 min Therapeutic Exercise:  [x] See flow sheet :     Rationale: increase ROM and increase strength to improve the patients ability to sleep    15 min Manual Therapy:    MET with right hamstring for right anterior innominate  MET FRS left L4/5, L3/4   Rationale: decrease pain, increase ROM, increase tissue extensibility and decrease trigger points  to improve the patients ability to sleep. With   [x] TE   [] TA   [] neuro   [] other: Patient Education: [x] Review HEP/written handout    [] Progressed/Changed HEP based on:   [] positioning   [] body mechanics   [] transfers   [] heat/ice application    [x] other: Decreased height of bridges to avoid lumbar extension position. Other Objective/Functional Measures:      Pain Level (0-10 scale) post treatment: 0    ASSESSMENT/Changes in Function:  Lumbar mechanical dysfunction noted, slight SI dysfunction. Patient will continue to benefit from skilled PT services to modify and progress therapeutic interventions, address functional mobility deficits, address ROM deficits, address strength deficits, analyze and address soft tissue restrictions, analyze and cue movement patterns, analyze and modify body mechanics/ergonomics and assess and modify postural abnormalities to attain remaining goals. []  See Plan of Care  []  See progress note/recertification  []  See Discharge Summary         Progress towards goals / Updated goals:    PLAN  [x]  Upgrade activities as tolerated     [x]  Continue plan of care  []  Update interventions per flow sheet       []  Discharge due to:_  [x]  Continue to progress lumbar stabilizatoin.     Tonie Rubio V, PT 10/2/2017  8:06 AM

## 2017-10-10 ENCOUNTER — APPOINTMENT (OUTPATIENT)
Dept: PHYSICAL THERAPY | Age: 52
End: 2017-10-10
Payer: COMMERCIAL

## 2017-10-16 ENCOUNTER — HOSPITAL ENCOUNTER (OUTPATIENT)
Dept: PHYSICAL THERAPY | Age: 52
Discharge: HOME | End: 2017-10-16
Payer: COMMERCIAL

## 2017-10-16 PROCEDURE — 97110 THERAPEUTIC EXERCISES: CPT | Performed by: PHYSICAL THERAPIST

## 2017-10-16 PROCEDURE — 97140 MANUAL THERAPY 1/> REGIONS: CPT | Performed by: PHYSICAL THERAPIST

## 2017-10-16 NOTE — PROGRESS NOTES
PT PROGRESS NOTE 2-15    Patient Name: Ranjith March  Date:10/16/2017  : 1965  [x]  Patient  Verified  Payor: Mora Story / Plan: Sin Enriquez / Product Type: PPO /    In time: 7:30 AM  Out time: 8:20 AM  Total Treatment Time (min): 50  Visit #: 10    Treatment Area: Low back pain [M54.5]    SUBJECTIVE  Pain Level (0-10 scale): 0  Any medication changes, allergies to medications, adverse drug reactions, diagnosis change, or new procedure performed?: [x] No    [] Yes (see summary sheet for update)  Subjective functional status/changes:   [] No changes reported  States still doing well overall since the injection. Went to [facility] on a trip, walked a lot on [employer], started having increased pain. Has off and on leg pain, especially after walking long periods and at night. OBJECTIVE  ASIS landmarks =  FRS left L4/5, L3/4    STRENGTH:   R   L  HIP FLEXORS:     3+   4  HIP ABDUCTORS:  4   4    *Decreased ROM right hip flexion noted during MMT. Marked trunk rotation noted with hip flexion MMT in attempt to stabilize her spine, with R>L hip flexion indicating possible spinal instability. PALPATION:  Tenderness B gluteus medius muscles.       Modality rationale: decrease inflammation and decrease pain to improve the patients ability to sleep   Min Type Additional Details    [] Estim: []Att   []Unatt        []TENS instruct                  []IFC  []Premod   []NMES                     []Other:  []w/US   []w/ice   []w/heat  Position:  Location:    []  Traction: [] Cervical       []Lumbar                       [] Prone          []Supine                       []Intermittent   []Continuous Lbs:  [] before manual  [] after manual  []w/heat    []  Ultrasound: []Continuous   [] Pulsed at:                            []1MHz   []3MHz Location:  W/cm2:    []  Paraffin         Location:  []w/heat    [x]  Ice     []  Heat   []  Ice massage Position:  Hook lying  Location:  Low back    [] Laser  []  Other: Position:  Location:    []  Vasopneumatic Device Pressure:       [] lo [] med [] hi   Temperature:    [x] Skin assessment post-treatment:  [x]intact []redness- no adverse reaction    []redness  adverse reaction:     30 min Therapeutic Exercise:  [x] See flow sheet :     Rationale: increase ROM and increase strength to improve the patients ability to sleep    15 min Manual Therapy:    MET FRS left L4/5, L3/4   Rationale: decrease pain, increase ROM, increase tissue extensibility and decrease trigger points  to improve the patients ability to sleep. With   [x] TE   [] TA   [] neuro   [] other: Patient Education: [x] Review HEP/written handout    [] Progressed/Changed HEP based on:   [] positioning   [] body mechanics   [] transfers   [] heat/ice application    [x] other: Decreased height of bridges to avoid lumbar extension position. Other Objective/Functional Measures:    FOTO Outcome Score:  Intake:  61/100  Today:  63/100    Pain Level (0-10 scale) post treatment: 0    ASSESSMENT/Changes in Function:  Improvements in LBP, leg symptoms, pelvic symmetry and function since initial visit. Lumbar mechanical findings, decreased hip strength with inability to stabilize her spine noted with certain activities. Patient will continue to benefit from skilled PT services to modify and progress therapeutic interventions, address functional mobility deficits, address ROM deficits, address strength deficits, analyze and address soft tissue restrictions, analyze and cue movement patterns, analyze and modify body mechanics/ergonomics and assess and modify postural abnormalities to attain remaining goals. []  See Plan of Care  []  See progress note/recertification  []  See Discharge Summary         Progress towards goals / Updated goals:  Short Term Goals:   -Independent in HEP as evidenced on ability to perform at least 5 exercises from HEP using proper form without verbal cuing.  Met  -Pain less than or equal to 3.5/10 at worst to allow patient to perform ADL's with greater ease  Met  -Demostrate proper posture in order to decrease lumbar pain  Partially met  -Pt will report compliance with icing 1-2x/day in order to decrease inflammation  Met     Long Term Goals:   -Radicular symptoms with sleeping improved 50% to allow pt to get more restful sleep  Met  -MMT 5/5 all planes to allow patient to perform ADL's Not met  -Pain 0/10 to allow patient to establish gym workout routine Not met  -FOTO score greater than or equal to 68 to allow patient to perform a greater amount of ADLs. Not met    PLAN  [x]  Upgrade activities as tolerated     [x]  Continue plan of care  []  Update interventions per flow sheet       []  Discharge due to:_  [x]  Continue to progress lumbar stabilizatoin.     Jonelle Momin V, PT 10/16/2017  7:46 AM

## 2017-10-25 ENCOUNTER — APPOINTMENT (OUTPATIENT)
Dept: PHYSICAL THERAPY | Age: 52
End: 2017-10-25
Payer: COMMERCIAL

## 2017-10-30 ENCOUNTER — HOSPITAL ENCOUNTER (OUTPATIENT)
Dept: PHYSICAL THERAPY | Age: 52
Discharge: HOME | End: 2017-10-30
Payer: COMMERCIAL

## 2017-10-30 PROCEDURE — 97140 MANUAL THERAPY 1/> REGIONS: CPT | Performed by: PHYSICAL THERAPIST

## 2017-10-30 PROCEDURE — 97110 THERAPEUTIC EXERCISES: CPT | Performed by: PHYSICAL THERAPIST

## 2017-10-30 NOTE — PROGRESS NOTES
PT DAILY TREATMENT NOTE 2-15    Patient Name: Rosmery Romero  Date:10/30/2017  : 1965  [x]  Patient  Verified  Payor: Fredrick Sauce / Plan: [employer] / Product Type: PPO /    In time: 7:35 AM  Out time: 8:35 AM  Total Treatment Time (min): 65  Visit #: 11    Treatment Area: Low back pain [M54.5]    SUBJECTIVE  Pain Level (0-10 scale): 1-2  Any medication changes, allergies to medications, adverse drug reactions, diagnosis change, or new procedure performed?: [x] No    [] Yes (see summary sheet for update)  Subjective functional status/changes:     \"I've been hurting all weekend. I think the shot just worked temporarily. \"    OBJECTIVE  Medial malleolus landmarks asymmetrical  Right ASIS low  FRS left L4/5, L3/4    Modality rationale: decrease inflammation and decrease pain to improve the patients ability to sleep   Min Type Additional Details    [] Estim: []Att   []Unatt        []TENS instruct                  []IFC  []Premod   []NMES                     []Other:  []w/US   []w/ice   []w/heat  Position:  Location:    []  Traction: [] Cervical       []Lumbar                       [] Prone          []Supine                       []Intermittent   []Continuous Lbs:  [] before manual  [] after manual  []w/heat    []  Ultrasound: []Continuous   [] Pulsed at:                            []1MHz   []3MHz Location:  W/cm2:    []  Paraffin         Location:  []w/heat   10 [x]  Ice     []  Heat   []  Ice massage Position:  Hook lying  Location:  Low back    []  Laser  []  Other: Position:  Location:    []  Vasopneumatic Device Pressure:       [] lo [] med [] hi   Temperature:    [x] Skin assessment post-treatment:  [x]intact []redness- no adverse reaction    []redness  adverse reaction:     30 min Therapeutic Exercise:  [x] See flow sheet :     Rationale: increase ROM and increase strength to improve the patients ability to sleep    15 min Manual Therapy:    MET with right hamstring for right anterior innominate  MET FRS left L4/5, L3/4   Rationale: decrease pain, increase ROM, increase tissue extensibility and decrease trigger points  to improve the patients ability to sleep. With   [x] TE   [] TA   [] neuro   [] other: Patient Education: [x] Review HEP/written handout    [] Progressed/Changed HEP based on:   [] positioning   [] body mechanics   [] transfers   [] heat/ice application    [x] other: Decreased height of bridges to avoid lumbar extension position. Other Objective/Functional Measures:      Pain Level (0-10 scale) post treatment: 1    ASSESSMENT/Changes in Function:  SI and lower lumbar mechanical dysfunction noted. Patient will continue to benefit from skilled PT services to modify and progress therapeutic interventions, address functional mobility deficits, address ROM deficits, address strength deficits, analyze and address soft tissue restrictions, analyze and cue movement patterns, analyze and modify body mechanics/ergonomics and assess and modify postural abnormalities to attain remaining goals. []  See Plan of Care  []  See progress note/recertification  []  See Discharge Summary         Progress towards goals / Updated goals:  Short Term Goals:   -Independent in HEP as evidenced on ability to perform at least 5 exercises from HEP using proper form without verbal cuing.  Met  -Pain less than or equal to 3.5/10 at worst to allow patient to perform ADL's with greater ease  Met  -Demostrate proper posture in order to decrease lumbar pain  Partially met  -Pt will report compliance with icing 1-2x/day in order to decrease inflammation  Met     Long Term Goals:   -Radicular symptoms with sleeping improved 50% to allow pt to get more restful sleep  Met  -MMT 5/5 all planes to allow patient to perform ADL's Not met  -Pain 0/10 to allow patient to establish gym workout routine Not met  -FOTO score greater than or equal to 68 to allow patient to perform a greater amount of ADLs.  Not met    PLAN  [x]  Upgrade activities as tolerated     [x]  Continue plan of care  []  Update interventions per flow sheet       []  Discharge due to:_  [x]  Continue to progress lumbar stabilization.     Israel Carter V, PT 10/30/2017  7:48 AM

## 2017-11-06 ENCOUNTER — APPOINTMENT (OUTPATIENT)
Dept: PHYSICAL THERAPY | Age: 52
End: 2017-11-06
Payer: COMMERCIAL

## 2017-11-07 ENCOUNTER — HOSPITAL ENCOUNTER (OUTPATIENT)
Dept: PHYSICAL THERAPY | Age: 52
Discharge: HOME | End: 2017-11-07
Payer: COMMERCIAL

## 2017-11-07 PROCEDURE — 97110 THERAPEUTIC EXERCISES: CPT | Performed by: PHYSICAL THERAPIST

## 2017-11-07 PROCEDURE — 97140 MANUAL THERAPY 1/> REGIONS: CPT | Performed by: PHYSICAL THERAPIST

## 2017-11-07 NOTE — PROGRESS NOTES
PT DAILY TREATMENT NOTE 2-15    Patient Name: Crystal Graves  Date:2017  : 1965  [x]  Patient  Verified  Payor: Merry Zee / Plan: Oksana Faye / Product Type: PPO /    In time: 4:55 PM  Out time: 5:55 PM  Total Treatment Time (min): 60  Visit #: 12    Treatment Area: Low back pain [M54.5]    SUBJECTIVE  Pain Level (0-10 scale): 1-2  Any medication changes, allergies to medications, adverse drug reactions, diagnosis change, or new procedure performed?: [x] No    [] Yes (see summary sheet for update)  Subjective functional status/changes: \"The pain has come back, not as bad, but it's been painful. \"  States hasn't been as faithful with her exercises this past week.     OBJECTIVE  Medial malleolus landmarks asymmetrical  Right ASIS low  FRS left L4/5, L3/4    Modality rationale: decrease inflammation and decrease pain to improve the patients ability to sleep   Min Type Additional Details    [] Estim: []Att   []Unatt        []TENS instruct                  []IFC  []Premod   []NMES                     []Other:  []w/US   []w/ice   []w/heat  Position:  Location:    []  Traction: [] Cervical       []Lumbar                       [] Prone          []Supine                       []Intermittent   []Continuous Lbs:  [] before manual  [] after manual  []w/heat    []  Ultrasound: []Continuous   [] Pulsed at:                            []1MHz   []3MHz Location:  W/cm2:    []  Paraffin         Location:  []w/heat   10 [x]  Ice     []  Heat   []  Ice massage Position:  Hook lying  Location:  Low back    []  Laser  []  Other: Position:  Location:    []  Vasopneumatic Device Pressure:       [] lo [] med [] hi   Temperature:    [x] Skin assessment post-treatment:  [x]intact []redness- no adverse reaction    []redness  adverse reaction:     35 min Therapeutic Exercise:  [x] See flow sheet :  Added leg press, seated marching with ab brace   Rationale: increase ROM and increase strength to improve the patients ability to sleep    15 min Manual Therapy:    MET with right hamstring for right anterior innominate  MET FRS left L4/5, L3/4   Rationale: decrease pain, increase ROM, increase tissue extensibility and decrease trigger points  to improve the patients ability to sleep. With   [x] TE   [] TA   [] neuro   [] other: Patient Education: [x] Review HEP/written handout    [] Progressed/Changed HEP based on:   [] positioning   [] body mechanics   [] transfers   [] heat/ice application    [x] other: Discussed exercises to do at the gym     Other Objective/Functional Measures:      Pain Level (0-10 scale) post treatment: 0    ASSESSMENT/Changes in Function:  Decreased pain with improved joint mechanics after manual therapy. Patient will continue to benefit from skilled PT services to modify and progress therapeutic interventions, address functional mobility deficits, address ROM deficits, address strength deficits, analyze and address soft tissue restrictions, analyze and cue movement patterns, analyze and modify body mechanics/ergonomics and assess and modify postural abnormalities to attain remaining goals. []  See Plan of Care  []  See progress note/recertification  []  See Discharge Summary         Progress towards goals / Updated goals:  Short Term Goals:   -Independent in HEP as evidenced on ability to perform at least 5 exercises from HEP using proper form without verbal cuing.  Met  -Pain less than or equal to 3.5/10 at worst to allow patient to perform ADL's with greater ease  Met  -Demostrate proper posture in order to decrease lumbar pain  Partially met  -Pt will report compliance with icing 1-2x/day in order to decrease inflammation  Met     Long Term Goals:   -Radicular symptoms with sleeping improved 50% to allow pt to get more restful sleep  Met  -MMT 5/5 all planes to allow patient to perform ADL's Not met  -Pain 0/10 to allow patient to establish gym workout routine Not met  -FOTO score greater than or equal to 68 to allow patient to perform a greater amount of ADLs. Not met    PLAN  [x]  Upgrade activities as tolerated     [x]  Continue plan of care  []  Update interventions per flow sheet       []  Discharge due to:_  [x]  Continue to progress lumbar stabilization.     Tonie Rubio V, PT 11/7/2017  5:01 PM

## 2017-11-21 ENCOUNTER — HOSPITAL ENCOUNTER (OUTPATIENT)
Dept: PHYSICAL THERAPY | Age: 52
Discharge: HOME | End: 2017-11-21
Payer: COMMERCIAL

## 2017-11-21 PROCEDURE — 97110 THERAPEUTIC EXERCISES: CPT | Performed by: PHYSICAL THERAPIST

## 2017-11-21 NOTE — PROGRESS NOTES
PT DAILY TREATMENT NOTE 2-15    Patient Name: Hannah Cook  Date:2017  : 1965  [x]  Patient  Verified  Payor: Lenard Sal / Plan: Eric Trinh / Product Type: PPO /    In time: 8:00 AM  Out time:  8:55 AM  Total Treatment Time (min): 55  Visit #: 13    Treatment Area: Low back pain [M54.5]    SUBJECTIVE  Pain Level (0-10 scale): 0-1  Any medication changes, allergies to medications, adverse drug reactions, diagnosis change, or new procedure performed?: [x] No    [] Yes (see summary sheet for update)  Subjective functional status/changes:     \"It's been descent. I've been trying to go to the gym 3 days a week. \"    OBJECTIVE  Medial malleolus landmarks asymmetrical  ASIS landmarks =    Modality rationale: decrease inflammation and decrease pain to improve the patients ability to sleep   Min Type Additional Details    [] Estim: []Att   []Unatt        []TENS instruct                  []IFC  []Premod   []NMES                     []Other:  []w/US   []w/ice   []w/heat  Position:  Location:    []  Traction: [] Cervical       []Lumbar                       [] Prone          []Supine                       []Intermittent   []Continuous Lbs:  [] before manual  [] after manual  []w/heat    []  Ultrasound: []Continuous   [] Pulsed at:                            []1MHz   []3MHz Location:  W/cm2:    []  Paraffin         Location:  []w/heat    [x]  Ice     []  Heat   []  Ice massage Position:  Hook lying  Location:  Low back    []  Laser  []  Other: Position:  Location:    []  Vasopneumatic Device Pressure:       [] lo [] med [] hi   Temperature:    [x] Skin assessment post-treatment:  [x]intact []redness- no adverse reaction    []redness  adverse reaction:     50 min Therapeutic Exercise:  [x] See flow sheet :  Added clam shells, short ROM bridges   Rationale: increase ROM and increase strength to improve the patients ability to sleep     min Manual Therapy:     Rationale: decrease pain, increase ROM, increase tissue extensibility and decrease trigger points  to improve the patients ability to sleep. With   [x] TE   [] TA   [] neuro   [] other: Patient Education: [x] Review HEP/written handout    [] Progressed/Changed HEP based on:   [] positioning   [] body mechanics   [] transfers   [] heat/ice application    [x] other: Discussed exercises to do at the gym     Other Objective/Functional Measures:      Pain Level (0-10 scale) post treatment: 0    ASSESSMENT/Changes in Function:  Pelvic symmetry today. Increased tolerance with stabilization exercises. Patient will continue to benefit from skilled PT services to modify and progress therapeutic interventions, address functional mobility deficits, address ROM deficits, address strength deficits, analyze and address soft tissue restrictions, analyze and cue movement patterns, analyze and modify body mechanics/ergonomics and assess and modify postural abnormalities to attain remaining goals. []  See Plan of Care  []  See progress note/recertification  []  See Discharge Summary         Progress towards goals / Updated goals:  Short Term Goals:   -Independent in HEP as evidenced on ability to perform at least 5 exercises from HEP using proper form without verbal cuing. Met  -Pain less than or equal to 3.5/10 at worst to allow patient to perform ADL's with greater ease  Met  -Demostrate proper posture in order to decrease lumbar pain  Partially met  -Pt will report compliance with icing 1-2x/day in order to decrease inflammation  Met     Long Term Goals:   -Radicular symptoms with sleeping improved 50% to allow pt to get more restful sleep  Met  -MMT 5/5 all planes to allow patient to perform ADL's Not met  -Pain 0/10 to allow patient to establish gym workout routine Not met  -FOTO score greater than or equal to 68 to allow patient to perform a greater amount of ADLs.   Not met    PLAN  [x]  Upgrade activities as tolerated     [x]  Continue plan of care  []  Update interventions per flow sheet       []  Discharge due to:_  [x]  Continue to progress lumbar stabilization.     Shirley Cheatham V, PT 11/21/2017  8:32 AM

## 2017-11-28 ENCOUNTER — APPOINTMENT (OUTPATIENT)
Dept: PHYSICAL THERAPY | Age: 52
End: 2017-11-28
Payer: COMMERCIAL

## 2017-11-30 ENCOUNTER — HOSPITAL ENCOUNTER (OUTPATIENT)
Dept: PHYSICAL THERAPY | Age: 52
Discharge: HOME | End: 2017-11-30
Payer: COMMERCIAL

## 2017-11-30 PROCEDURE — 97110 THERAPEUTIC EXERCISES: CPT | Performed by: PHYSICAL THERAPIST

## 2017-11-30 PROCEDURE — 97140 MANUAL THERAPY 1/> REGIONS: CPT | Performed by: PHYSICAL THERAPIST

## 2017-11-30 NOTE — PROGRESS NOTES
PT DAILY TREATMENT NOTE 2-15    Patient Name: Stiven Lipscomb  Date:2017  : 1965  [x]  Patient  Verified  Payor: Sultana Mitchell / Plan: [id number] / Product Type: PPO /    In time: 7:30 AM  Out time:  8:20 AM  Total Treatment Time (min): 50  Visit #: 14    Treatment Area: Low back pain [M54.5]    SUBJECTIVE  Pain Level (0-10 scale): 2  Any medication changes, allergies to medications, adverse drug reactions, diagnosis change, or new procedure performed?: [x] No    [] Yes (see summary sheet for update)  Subjective functional status/changes:     \"I think it's off again. I've been doing a lot of driving and sitting hurts. \"  States hasn't been able to get to the gym this week.     OBJECTIVE  Medial malleolus landmarks asymmetrical  Right ASIS low  FRS left L3/4 and L4/5    Modality rationale: decrease inflammation and decrease pain to improve the patients ability to sleep   Min Type Additional Details    [] Estim: []Att   []Unatt        []TENS instruct                  []IFC  []Premod   []NMES                     []Other:  []w/US   []w/ice   []w/heat  Position:  Location:    []  Traction: [] Cervical       []Lumbar                       [] Prone          []Supine                       []Intermittent   []Continuous Lbs:  [] before manual  [] after manual  []w/heat    []  Ultrasound: []Continuous   [] Pulsed at:                            []1MHz   []3MHz Location:  W/cm2:    []  Paraffin         Location:  []w/heat    [x]  Ice     []  Heat   []  Ice massage Position:  Hook lying  Location:  Low back    []  Laser  []  Other: Position:  Location:    []  Vasopneumatic Device Pressure:       [] lo [] med [] hi   Temperature:    [x] Skin assessment post-treatment:  [x]intact []redness- no adverse reaction    []redness  adverse reaction:     30 min Therapeutic Exercise:  [x] See flow sheet :     Rationale: increase ROM and increase strength to improve the patients ability to sleep    20 min Manual Therapy:    MET with right hamstring for right anterior innominate  MET FRS left L4/5, L3/4  Manual hamstring stretch   Rationale: decrease pain, increase ROM, increase tissue extensibility and decrease trigger points  to improve the patients ability to sleep. With   [x] TE   [] TA   [] neuro   [] other: Patient Education: [x] Review HEP/written handout    [] Progressed/Changed HEP based on:   [] positioning   [] body mechanics   [] transfers   [] heat/ice application    [x] other: Instructed in proper supine marches with ab bracing     Other Objective/Functional Measures:      Pain Level (0-10 scale) post treatment: 0    ASSESSMENT/Changes in Function:  SI and mechanical findings today. Improved with manual therapy. Patient will continue to benefit from skilled PT services to modify and progress therapeutic interventions, address functional mobility deficits, address ROM deficits, address strength deficits, analyze and address soft tissue restrictions, analyze and cue movement patterns, analyze and modify body mechanics/ergonomics and assess and modify postural abnormalities to attain remaining goals. []  See Plan of Care  []  See progress note/recertification  []  See Discharge Summary         Progress towards goals / Updated goals:  Short Term Goals:   -Independent in HEP as evidenced on ability to perform at least 5 exercises from HEP using proper form without verbal cuing.  Met  -Pain less than or equal to 3.5/10 at worst to allow patient to perform ADL's with greater ease  Met  -Demostrate proper posture in order to decrease lumbar pain  Partially met  -Pt will report compliance with icing 1-2x/day in order to decrease inflammation  Met     Long Term Goals:   -Radicular symptoms with sleeping improved 50% to allow pt to get more restful sleep  Met  -MMT 5/5 all planes to allow patient to perform ADL's Not met  -Pain 0/10 to allow patient to establish gym workout routine Not met  -FOTO score greater than or equal to 68 to allow patient to perform a greater amount of ADLs. Not met    PLAN  [x]  Upgrade activities as tolerated     [x]  Continue plan of care  []  Update interventions per flow sheet       []  Discharge due to:_  [x]  Continue to progress lumbar stabilization.     Camila Cross V, PT 11/30/2017  8:10 AM

## 2017-12-04 ENCOUNTER — HOSPITAL ENCOUNTER (OUTPATIENT)
Dept: PHYSICAL THERAPY | Age: 52
Discharge: HOME | End: 2017-12-04
Payer: COMMERCIAL

## 2017-12-04 PROCEDURE — 97110 THERAPEUTIC EXERCISES: CPT | Performed by: PHYSICAL THERAPIST

## 2017-12-04 PROCEDURE — 97140 MANUAL THERAPY 1/> REGIONS: CPT | Performed by: PHYSICAL THERAPIST

## 2017-12-04 NOTE — PROGRESS NOTES
PT DAILY TREATMENT NOTE 2-15    Patient Name: Christa Phipps  Date:2017  : 1965  [x]  Patient  Verified  Payor: Kirill Edward / Plan: Radha Cali / Product Type: PPO /    In time: 7:30 AM  Out time:  8:30 AM  Total Treatment Time (min): 60  Visit #: 15    Treatment Area: Low back pain [M54.5]    SUBJECTIVE  Pain Level (0-10 scale): 0  Any medication changes, allergies to medications, adverse drug reactions, diagnosis change, or new procedure performed?: [x] No    [] Yes (see summary sheet for update)  Subjective functional status/changes: \"It hasn't felt too bad. \"    OBJECTIVE  Medial malleolus landmarks symmetrical  ASIS landmarks =  FRS left L3/4 and L4/5    Modality rationale: decrease inflammation and decrease pain to improve the patients ability to sleep   Min Type Additional Details    [] Estim: []Att   []Unatt        []TENS instruct                  []IFC  []Premod   []NMES                     []Other:  []w/US   []w/ice   []w/heat  Position:  Location:    []  Traction: [] Cervical       []Lumbar                       [] Prone          []Supine                       []Intermittent   []Continuous Lbs:  [] before manual  [] after manual  []w/heat    []  Ultrasound: []Continuous   [] Pulsed at:                            []1MHz   []3MHz Location:  W/cm2:    []  Paraffin         Location:  []w/heat    [x]  Ice     []  Heat   []  Ice massage Position:  Hook lying  Location:  Low back    []  Laser  []  Other: Position:  Location:    []  Vasopneumatic Device Pressure:       [] lo [] med [] hi   Temperature:    [x] Skin assessment post-treatment:  [x]intact []redness- no adverse reaction    []redness  adverse reaction:     40 min Therapeutic Exercise:  [x] See flow sheet :     Rationale: increase ROM and increase strength to improve the patients ability to sleep    20 min Manual Therapy:    MET FRS left L4/5, L3/4  Manual hamstring stretch   Rationale: decrease pain, increase ROM, increase tissue extensibility and decrease trigger points  to improve the patients ability to sleep. With   [x] TE   [] TA   [] neuro   [] other: Patient Education: [x] Review HEP/written handout    [] Progressed/Changed HEP based on:   [] positioning   [] body mechanics   [] transfers   [] heat/ice application    [x] other: Instructed in proper supine marches with ab bracing     Other Objective/Functional Measures:      Pain Level (0-10 scale) post treatment: 0    ASSESSMENT/Changes in Function:   Improved SI symmetry, lumbar mechanical findings present. Patient will continue to benefit from skilled PT services to modify and progress therapeutic interventions, address functional mobility deficits, address ROM deficits, address strength deficits, analyze and address soft tissue restrictions, analyze and cue movement patterns, analyze and modify body mechanics/ergonomics and assess and modify postural abnormalities to attain remaining goals. []  See Plan of Care  []  See progress note/recertification  []  See Discharge Summary         Progress towards goals / Updated goals:  Short Term Goals:   -Independent in HEP as evidenced on ability to perform at least 5 exercises from HEP using proper form without verbal cuing. Met  -Pain less than or equal to 3.5/10 at worst to allow patient to perform ADL's with greater ease  Met  -Demostrate proper posture in order to decrease lumbar pain  Partially met  -Pt will report compliance with icing 1-2x/day in order to decrease inflammation  Met     Long Term Goals:   -Radicular symptoms with sleeping improved 50% to allow pt to get more restful sleep  Met  -MMT 5/5 all planes to allow patient to perform ADL's Not met  -Pain 0/10 to allow patient to establish gym workout routine Not met  -FOTO score greater than or equal to 68 to allow patient to perform a greater amount of ADLs.   Not met    PLAN  [x]  Upgrade activities as tolerated     [x]  Continue plan of care  []  Update interventions per flow sheet       []  Discharge due to:_  [x]  Continue to progress lumbar stabilization.     Israel Carter V, PT 12/4/2017  7:41 AM

## 2017-12-15 ENCOUNTER — HOSPITAL ENCOUNTER (OUTPATIENT)
Dept: PHYSICAL THERAPY | Age: 52
Discharge: HOME | End: 2017-12-15
Payer: COMMERCIAL

## 2017-12-15 PROCEDURE — 97140 MANUAL THERAPY 1/> REGIONS: CPT | Performed by: PHYSICAL THERAPIST

## 2017-12-15 PROCEDURE — 97110 THERAPEUTIC EXERCISES: CPT | Performed by: PHYSICAL THERAPIST

## 2017-12-15 NOTE — PROGRESS NOTES
PT DAILY TREATMENT NOTE 2-15    Patient Name: Mabel Willis  Date:12/15/2017  : 1965  [x]  Patient  Verified  Payor: Harmony Ramsey / Plan: Paula Navarro / Product Type: PPO /    In time: 7:30 AM  Out time:  8:30 AM  Total Treatment Time (min): 60  Visit #: 16    Treatment Area: Low back pain [M54.5]    SUBJECTIVE  Pain Level (0-10 scale): 5  Any medication changes, allergies to medications, adverse drug reactions, diagnosis change, or new procedure performed?: [x] No    [] Yes (see summary sheet for update)  Subjective functional status/changes:     States increased pain last week, no new activity or injury. Better today but still not doing well. Complains of increased low back pain.     OBJECTIVE  Medial malleolus landmarks asymmetrical  Right ASIS low  FRS left L3/4 and L4/5  Severe tenderness B lower lumbar multifidus muscles    Modality rationale: decrease inflammation and decrease pain to improve the patients ability to sleep   Min Type Additional Details    [] Estim: []Att   []Unatt        []TENS instruct                  []IFC  []Premod   []NMES                     []Other:  []w/US   []w/ice   []w/heat  Position:  Location:    []  Traction: [] Cervical       []Lumbar                       [] Prone          []Supine                       []Intermittent   []Continuous Lbs:  [] before manual  [] after manual  []w/heat    []  Ultrasound: []Continuous   [] Pulsed at:                            []1MHz   []3MHz Location:  W/cm2:    []  Paraffin         Location:  []w/heat   15 []  Ice     [x]  Heat pre treatment  []  Ice massage Position:  Hook lying  Location:  Low back    []  Laser  []  Other: Position:  Location:    []  Vasopneumatic Device Pressure:       [] lo [] med [] hi   Temperature:    [x] Skin assessment post-treatment:  [x]intact []redness- no adverse reaction    []redness  adverse reaction:     20 min Therapeutic Exercise:  [x] See flow sheet :     Rationale: increase ROM and increase strength to improve the patients ability to sleep    25 min Manual Therapy:    MET for right anterior innominate  MET FRS left L4/5, L3/4  Manual hamstring stretch omit  STM B lower lumbar multifidi muscles   Rationale: decrease pain, increase ROM, increase tissue extensibility and decrease trigger points  to improve the patients ability to sleep. With   [x] TE   [] TA   [] neuro   [] other: Patient Education: [x] Review HEP/written handout    [] Progressed/Changed HEP based on:   [] positioning   [] body mechanics   [] transfers   [] heat/ice application    [x] other: Instructed in proper supine marches with ab bracing     Other Objective/Functional Measures:      Pain Level (0-10 scale) post treatment: 2    ASSESSMENT/Changes in Function:   Decreased LBP after manual therapy, especially multifidi soft tissue work. Patient will continue to benefit from skilled PT services to modify and progress therapeutic interventions, address functional mobility deficits, address ROM deficits, address strength deficits, analyze and address soft tissue restrictions, analyze and cue movement patterns, analyze and modify body mechanics/ergonomics and assess and modify postural abnormalities to attain remaining goals. []  See Plan of Care  []  See progress note/recertification  []  See Discharge Summary         Progress towards goals / Updated goals:  Short Term Goals:   -Independent in HEP as evidenced on ability to perform at least 5 exercises from HEP using proper form without verbal cuing.  Met  -Pain less than or equal to 3.5/10 at worst to allow patient to perform ADL's with greater ease  Met  -Demostrate proper posture in order to decrease lumbar pain  Partially met  -Pt will report compliance with icing 1-2x/day in order to decrease inflammation  Met     Long Term Goals:   -Radicular symptoms with sleeping improved 50% to allow pt to get more restful sleep  Met  -MMT 5/5 all planes to allow patient to perform ADL's Not met  -Pain 0/10 to allow patient to establish gym workout routine Not met  -FOTO score greater than or equal to 68 to allow patient to perform a greater amount of ADLs. Not met    PLAN  [x]  Upgrade activities as tolerated     [x]  Continue plan of care  []  Update interventions per flow sheet       []  Discharge due to:_  [x]  Continue to progress lumbar stabilization.     Homer Sommers V, PT 12/15/2017  12:19 PM

## 2018-01-09 ENCOUNTER — HOSPITAL ENCOUNTER (OUTPATIENT)
Dept: PHYSICAL THERAPY | Age: 53
Discharge: HOME | End: 2018-01-09
Payer: COMMERCIAL

## 2018-01-09 PROCEDURE — 97140 MANUAL THERAPY 1/> REGIONS: CPT | Performed by: PHYSICAL THERAPIST

## 2018-01-09 PROCEDURE — 97110 THERAPEUTIC EXERCISES: CPT | Performed by: PHYSICAL THERAPIST

## 2018-01-09 NOTE — PROGRESS NOTES
PT DAILY TREATMENT NOTE 2-15    Patient Name: Tremayne Coe  Date:2018  : 1965  [x]  Patient  Verified  Payor: Adam Caro / Plan: Dang Pozo / Product Type: PPO /    In time: 7:35 AM  Out time:  8:30 AM  Total Treatment Time (min): 55  Visit #: 17    Treatment Area: Low back pain [M54.5]    SUBJECTIVE  Pain Level (0-10 scale): 1  Any medication changes, allergies to medications, adverse drug reactions, diagnosis change, or new procedure performed?: [x] No    [] Yes (see summary sheet for update)  Subjective functional status/changes:     \"I did ok on my trip. We walked about 1500 steps a day and I think that's what made the difference. I feel like my hips are off today. \"    OBJECTIVE  Right ASIS low  FRS left L3/4     Modality rationale: decrease inflammation and decrease pain to improve the patients ability to sleep   Min Type Additional Details    [] Estim: []Att   []Unatt        []TENS instruct                  []IFC  []Premod   []NMES                     []Other:  []w/US   []w/ice   []w/heat  Position:  Location:    []  Traction: [] Cervical       []Lumbar                       [] Prone          []Supine                       []Intermittent   []Continuous Lbs:  [] before manual  [] after manual  []w/heat    []  Ultrasound: []Continuous   [] Pulsed at:                            []1MHz   []3MHz Location:  W/cm2:    []  Paraffin         Location:  []w/heat    []  Ice     []  Heat pre treatment  []  Ice massage Position:  Hook lying  Location:  Low back    []  Laser  []  Other: Position:  Location:    []  Vasopneumatic Device Pressure:       [] lo [] med [] hi   Temperature:    [x] Skin assessment post-treatment:  [x]intact []redness- no adverse reaction    []redness  adverse reaction:     35 min Therapeutic Exercise:  [x] See flow sheet :     Rationale: increase ROM and increase strength to improve the patients ability to sleep    15 min Manual Therapy:    MET for right anterior innominate  MET FRS left L3/4  Manual hamstring stretch omit  STM B lower lumbar multifidi muscles (omit)   Rationale: decrease pain, increase ROM, increase tissue extensibility and decrease trigger points  to improve the patients ability to sleep. With   [x] TE   [] TA   [] neuro   [] other: Patient Education: [x] Review HEP/written handout    [] Progressed/Changed HEP based on:   [] positioning   [] body mechanics   [] transfers   [] heat/ice application    [x] other: Instructed in proper supine marches with ab bracing     Other Objective/Functional Measures:      Pain Level (0-10 scale) post treatment: 0    ASSESSMENT/Changes in Function:   SI and lumbar rotational dysfunction noted. Improves with manual therapy. Patient will continue to benefit from skilled PT services to modify and progress therapeutic interventions, address functional mobility deficits, address ROM deficits, address strength deficits, analyze and address soft tissue restrictions, analyze and cue movement patterns, analyze and modify body mechanics/ergonomics and assess and modify postural abnormalities to attain remaining goals. []  See Plan of Care  []  See progress note/recertification  []  See Discharge Summary         Progress towards goals / Updated goals:  Short Term Goals:   -Independent in HEP as evidenced on ability to perform at least 5 exercises from HEP using proper form without verbal cuing.  Met  -Pain less than or equal to 3.5/10 at worst to allow patient to perform ADL's with greater ease  Met  -Demostrate proper posture in order to decrease lumbar pain  Partially met  -Pt will report compliance with icing 1-2x/day in order to decrease inflammation  Met     Long Term Goals:   -Radicular symptoms with sleeping improved 50% to allow pt to get more restful sleep  Met  -MMT 5/5 all planes to allow patient to perform ADL's Not met  -Pain 0/10 to allow patient to establish gym workout routine Not met  -FOTO score greater than or equal to 68 to allow patient to perform a greater amount of ADLs. Not met    PLAN  [x]  Upgrade activities as tolerated     [x]  Continue plan of care  []  Update interventions per flow sheet       []  Discharge due to:_  [x]  Continue to progress lumbar stabilization.     Awa Kohler V, PT 1/9/2018  7:37 AM

## 2018-01-16 ENCOUNTER — APPOINTMENT (OUTPATIENT)
Dept: PHYSICAL THERAPY | Age: 53
End: 2018-01-16
Payer: COMMERCIAL

## 2018-01-18 ENCOUNTER — APPOINTMENT (OUTPATIENT)
Dept: PHYSICAL THERAPY | Age: 53
End: 2018-01-18
Payer: COMMERCIAL

## 2018-01-25 ENCOUNTER — HOSPITAL ENCOUNTER (OUTPATIENT)
Dept: PHYSICAL THERAPY | Age: 53
Discharge: HOME | End: 2018-01-25
Payer: COMMERCIAL

## 2018-01-25 PROCEDURE — 97110 THERAPEUTIC EXERCISES: CPT | Performed by: PHYSICAL THERAPIST

## 2018-01-25 PROCEDURE — 97140 MANUAL THERAPY 1/> REGIONS: CPT | Performed by: PHYSICAL THERAPIST

## 2018-01-25 NOTE — PROGRESS NOTES
PT DAILY TREATMENT NOTE 2-15    Patient Name: Rosalinda Singh  Date:2018  : 1965  [x]  Patient  Verified  Payor: Linda Cornell / Plan: Estella Gallo / Product Type: PPO /    In time: 7:350AM  Out time:  8:25 AM  Total Treatment Time (min): 55  Visit #: 18    Treatment Area: Low back pain [M54.5]    SUBJECTIVE  Pain Level (0-10 scale): 1-2  Any medication changes, allergies to medications, adverse drug reactions, diagnosis change, or new procedure performed?: [x] No    [] Yes (see summary sheet for update)  Subjective functional status/changes:     \"It's ok. I'm not having pain down my legs now, just on the right side of my back. \"  States performing exercises daily which is helping.     OBJECTIVE  ASIS landmarks =  FRS left L3/4     Modality rationale: decrease inflammation and decrease pain to improve the patients ability to sleep   Min Type Additional Details    [] Estim: []Att   []Unatt        []TENS instruct                  []IFC  []Premod   []NMES                     []Other:  []w/US   []w/ice   []w/heat  Position:  Location:    []  Traction: [] Cervical       []Lumbar                       [] Prone          []Supine                       []Intermittent   []Continuous Lbs:  [] before manual  [] after manual  []w/heat    []  Ultrasound: []Continuous   [] Pulsed at:                            []1MHz   []3MHz Location:  W/cm2:    []  Paraffin         Location:  []w/heat    []  Ice     []  Heat pre treatment  []  Ice massage Position:  Hook lying  Location:  Low back    []  Laser  []  Other: Position:  Location:    []  Vasopneumatic Device Pressure:       [] lo [] med [] hi   Temperature:    [x] Skin assessment post-treatment:  [x]intact []redness- no adverse reaction    []redness  adverse reaction:     35 min Therapeutic Exercise:  [x] See flow sheet :     Rationale: increase ROM and increase strength to improve the patients ability to sleep    15 min Manual Therapy:    MET for right anterior innominate (omit)  MET FRS left L3/4  Manual hamstring stretch omit  STM R QL muscle   Rationale: decrease pain, increase ROM, increase tissue extensibility and decrease trigger points  to improve the patients ability to sleep. With   [x] TE   [] TA   [] neuro   [] other: Patient Education: [x] Review HEP/written handout    [] Progressed/Changed HEP based on:   [] positioning   [] body mechanics   [] transfers   [] heat/ice application    [x] other: Instructed in proper supine marches with ab bracing     Other Objective/Functional Measures:      Pain Level (0-10 scale) post treatment: 1    ASSESSMENT/Changes in Function:   Soft tissue and rotational lumbar dysfunction noted. No SI rotation noted today. Patient will continue to benefit from skilled PT services to modify and progress therapeutic interventions, address functional mobility deficits, address ROM deficits, address strength deficits, analyze and address soft tissue restrictions, analyze and cue movement patterns, analyze and modify body mechanics/ergonomics and assess and modify postural abnormalities to attain remaining goals. []  See Plan of Care  []  See progress note/recertification  []  See Discharge Summary         Progress towards goals / Updated goals:  Short Term Goals:   -Independent in HEP as evidenced on ability to perform at least 5 exercises from HEP using proper form without verbal cuing.  Met  -Pain less than or equal to 3.5/10 at worst to allow patient to perform ADL's with greater ease  Met  -Demostrate proper posture in order to decrease lumbar pain  Partially met  -Pt will report compliance with icing 1-2x/day in order to decrease inflammation  Met     Long Term Goals:   -Radicular symptoms with sleeping improved 50% to allow pt to get more restful sleep  Met  -MMT 5/5 all planes to allow patient to perform ADL's Not met  -Pain 0/10 to allow patient to establish gym workout routine Not met  -FOTO score greater than or equal to 68 to allow patient to perform a greater amount of ADLs. Not met    PLAN  [x]  Upgrade activities as tolerated     [x]  Continue plan of care  []  Update interventions per flow sheet       []  Discharge due to:_  [x]  Continue to progress lumbar stabilization.     Carlos Olivo V, PT 1/25/2018  8:06 AM

## 2018-02-01 ENCOUNTER — HOSPITAL ENCOUNTER (OUTPATIENT)
Dept: PHYSICAL THERAPY | Age: 53
Discharge: HOME | End: 2018-02-01
Payer: COMMERCIAL

## 2018-02-01 PROCEDURE — 97140 MANUAL THERAPY 1/> REGIONS: CPT | Performed by: PHYSICAL THERAPIST

## 2018-02-01 PROCEDURE — 97110 THERAPEUTIC EXERCISES: CPT | Performed by: PHYSICAL THERAPIST

## 2018-02-01 NOTE — PROGRESS NOTES
PT DAILY TREATMENT NOTE 2-15    Patient Name: Rosi Medina  Date:2018  : 1965  [x]  Patient  Verified  Payor: Richardine Goodpasture / Plan: Izabel Calvo / Product Type: PPO /    In time: 7:30 AM  Out time:  8:15 AM  Total Treatment Time (min): 45  Visit #: 19    Treatment Area: Low back pain [M54.5]    SUBJECTIVE  Pain Level (0-10 scale): 1  Any medication changes, allergies to medications, adverse drug reactions, diagnosis change, or new procedure performed?: [x] No    [] Yes (see summary sheet for update)  Subjective functional status/changes:     \"It's not bad. I wore heels this week so I have some back pain, but it's not going down my leg. \"    OBJECTIVE  ASIS landmarks =  Tenderness right quadratus lumborum muscle  FRS left L4/5     Modality rationale: decrease inflammation and decrease pain to improve the patients ability to sleep   Min Type Additional Details    [] Estim: []Att   []Unatt        []TENS instruct                  []IFC  []Premod   []NMES                     []Other:  []w/US   []w/ice   []w/heat  Position:  Location:    []  Traction: [] Cervical       []Lumbar                       [] Prone          []Supine                       []Intermittent   []Continuous Lbs:  [] before manual  [] after manual  []w/heat    []  Ultrasound: []Continuous   [] Pulsed at:                            []1MHz   []3MHz Location:  W/cm2:    []  Paraffin         Location:  []w/heat    []  Ice     []  Heat pre treatment  []  Ice massage Position:  Hook lying  Location:  Low back    []  Laser  []  Other: Position:  Location:    []  Vasopneumatic Device Pressure:       [] lo [] med [] hi   Temperature:    [x] Skin assessment post-treatment:  [x]intact []redness- no adverse reaction    []redness  adverse reaction:     20 min Therapeutic Exercise:  [x] See flow sheet :     Rationale: increase ROM and increase strength to improve the patients ability to sleep    25 min Manual Therapy:    MET FRS left L4/5  Manual hamstring stretch  STM R QL muscle   Rationale: decrease pain, increase ROM, increase tissue extensibility and decrease trigger points  to improve the patients ability to sleep. With   [x] TE   [] TA   [] neuro   [] other: Patient Education: [x] Review HEP/written handout    [] Progressed/Changed HEP based on:   [] positioning   [] body mechanics   [] transfers   [] heat/ice application    [x] other: Instructed in proper supine marches with ab bracing     Other Objective/Functional Measures:      Pain Level (0-10 scale) post treatment: 1    ASSESSMENT/Changes in Function:   Decreased back pain overall, pelvic symmetry noted. Patient will continue to benefit from skilled PT services to modify and progress therapeutic interventions, address functional mobility deficits, address ROM deficits, address strength deficits, analyze and address soft tissue restrictions, analyze and cue movement patterns, analyze and modify body mechanics/ergonomics and assess and modify postural abnormalities to attain remaining goals. []  See Plan of Care  []  See progress note/recertification  []  See Discharge Summary         Progress towards goals / Updated goals:  Short Term Goals:   -Independent in HEP as evidenced on ability to perform at least 5 exercises from HEP using proper form without verbal cuing.  Met  -Pain less than or equal to 3.5/10 at worst to allow patient to perform ADL's with greater ease  Met  -Demostrate proper posture in order to decrease lumbar pain  Partially met  -Pt will report compliance with icing 1-2x/day in order to decrease inflammation  Met     Long Term Goals:   -Radicular symptoms with sleeping improved 50% to allow pt to get more restful sleep  Met  -MMT 5/5 all planes to allow patient to perform ADL's Not met  -Pain 0/10 to allow patient to establish gym workout routine Not met  -FOTO score greater than or equal to 68 to allow patient to perform a greater amount of ADLs.  Not met    PLAN  [x]  Upgrade activities as tolerated     [x]  Continue plan of care  []  Update interventions per flow sheet       []  Discharge due to:_  [x]  Continue to progress lumbar stabilization.     Shabbir Fatima V, PT 2/1/2018  7:42 AM

## 2018-02-13 ENCOUNTER — HOSPITAL ENCOUNTER (OUTPATIENT)
Dept: PHYSICAL THERAPY | Age: 53
Discharge: HOME | End: 2018-02-13
Payer: COMMERCIAL

## 2018-02-13 PROCEDURE — 97110 THERAPEUTIC EXERCISES: CPT | Performed by: PHYSICAL THERAPIST

## 2018-02-13 PROCEDURE — 97140 MANUAL THERAPY 1/> REGIONS: CPT | Performed by: PHYSICAL THERAPIST

## 2018-02-13 NOTE — PROGRESS NOTES
PT PROGRESS NOTE 2-15    Patient Name: Mahad Beard  Date:2018  : 1965  [x]  Patient  Verified  Payor: Effie Navarrete / Plan: Taina Karen / Product Type: PPO /    In time: 7:30 AM  Out time:  8:15 AM  Total Treatment Time (min): 45  Visit #: 20    Treatment Area: Low back pain [M54.5]    SUBJECTIVE  Pain Level (0-10 scale): 0  Any medication changes, allergies to medications, adverse drug reactions, diagnosis change, or new procedure performed?: [x] No    [] Yes (see summary sheet for update)  Subjective functional status/changes:     States low back has been doing well. She is more consistent with her exercises which has helped. No longer having leg pain. Able to work out at the gym without increased pain.     OBJECTIVE  Mechanical Findings:  ASIS landmarks =  No rotational dysfunction noted in lumbar spine      STRENGTH:   R   L  HIP FLEXORS:     4+   4+  HIP ABDUCTORS:  4+   4+    PALPATION:  Mild tenderness right quadratus lumborum muscle    Modality rationale: decrease inflammation and decrease pain to improve the patients ability to sleep   Min Type Additional Details    [] Estim: []Att   []Unatt        []TENS instruct                  []IFC  []Premod   []NMES                     []Other:  []w/US   []w/ice   []w/heat  Position:  Location:    []  Traction: [] Cervical       []Lumbar                       [] Prone          []Supine                       []Intermittent   []Continuous Lbs:  [] before manual  [] after manual  []w/heat    []  Ultrasound: []Continuous   [] Pulsed at:                            []1MHz   []3MHz Location:  W/cm2:    []  Paraffin         Location:  []w/heat    []  Ice     []  Heat pre treatment  []  Ice massage Position:  Hook lying  Location:  Low back    []  Laser  []  Other: Position:  Location:    []  Vasopneumatic Device Pressure:       [] lo [] med [] hi   Temperature:    [x] Skin assessment post-treatment:  [x]intact []redness- no adverse reaction    []redness  adverse reaction:     30 min Therapeutic Exercise:  [x] See flow sheet :     Rationale: increase ROM and increase strength to improve the patients ability to sleep     15 min Manual Therapy:    Manual hamstring stretch B     Rationale: decrease pain, increase ROM, increase tissue extensibility and decrease trigger points  to improve the patients ability to sleep. With   [x] TE   [] TA   [] neuro   [] other: Patient Education: [x] Review HEP/written handout    [] Progressed/Changed HEP based on:   [] positioning   [] body mechanics   [] transfers   [] heat/ice application    [x] other: Instructed in proper supine marches with ab bracing     Other Objective/Functional Measures:    FOTO Outcome Score:  Intake:  61/100  Today:  70/100    Pain Level (0-10 scale) post treatment: 1    ASSESSMENT/Changes in Function:   Patient has made good progress the past few visits. Currently pain free, no mechanical findings, improved function. Progress towards goals / Updated goals:   Long Term Goals:   -Radicular symptoms with sleeping improved 50% to allow pt to get more restful sleep  Met  -MMT 5/5 all planes to allow patient to perform ADL's Not met, but improved  -Pain 0/10 to allow patient to establish gym workout routine Met  -FOTO score greater than or equal to 68 to allow patient to perform a greater amount of ADLs. Not met, but improved. PLAN  [x]  Discharge due to:   Independent HEP/gym program.    Angeline Dao, PT 2/13/2018  8:13 AM

## 2018-02-16 NOTE — PROGRESS NOTES
PT DISCHARGE NOTE 2-15    Patient Name: Roula Page  Date:2018  : 1965  [x]  Patient  Verified  Payor: Robert Malin / Plan: Vandana Polanco / Product Type: PPO /      Treatment Area: Low back pain [M54.5]    SUBJECTIVE  Pain Level (0-10 scale): 0  Subjective functional status/changes:     States low back has been doing well. She is more consistent with her exercises which has helped. No longer having leg pain. Able to work out at the gym without increased pain. OBJECTIVE  Mechanical Findings:  ASIS landmarks =  No rotational dysfunction noted in lumbar spine      STRENGTH:   R   L  HIP FLEXORS:     4+   4+  HIP ABDUCTORS:  4+   4+    PALPATION:  Mild tenderness right quadratus lumborum muscle      Other Objective/Functional Measures:    FOTO Outcome Score:  Intake:  61/100  Today:  70/100    Pain Level (0-10 scale) post treatment: 1    ASSESSMENT/Changes in Function:   Patient has made good progress the past few visits. Currently pain free, no mechanical findings, improved function. Progress towards goals / Updated goals:   Long Term Goals:   -Radicular symptoms with sleeping improved 50% to allow pt to get more restful sleep  Met  -MMT 5/5 all planes to allow patient to perform ADL's Not met, but improved  -Pain 0/10 to allow patient to establish gym workout routine Met  -FOTO score greater than or equal to 68 to allow patient to perform a greater amount of ADLs. Not met, but improved. PLAN  [x]  Discharge due to:   Independent HEP/gym program.    Terrance Abernathy, PT 2018  4:26 PM

## 2018-02-20 ENCOUNTER — APPOINTMENT (OUTPATIENT)
Dept: PHYSICAL THERAPY | Age: 53
End: 2018-02-20
Payer: COMMERCIAL

## 2018-03-01 ENCOUNTER — APPOINTMENT (OUTPATIENT)
Dept: PHYSICAL THERAPY | Age: 53
End: 2018-03-01

## 2018-05-15 ENCOUNTER — OFFICE VISIT (OUTPATIENT)
Dept: OBGYN CLINIC | Age: 53
End: 2018-05-15

## 2018-05-15 VITALS — DIASTOLIC BLOOD PRESSURE: 64 MMHG | BODY MASS INDEX: 21.6 KG/M2 | SYSTOLIC BLOOD PRESSURE: 122 MMHG | WEIGHT: 129.8 LBS

## 2018-05-15 DIAGNOSIS — Z01.419 WELL WOMAN EXAM WITH ROUTINE GYNECOLOGICAL EXAM: Primary | ICD-10-CM

## 2018-05-15 NOTE — PROGRESS NOTES
Annual exam ages 40-58    Diane Mock is a No obstetric history on file. ,  48 y.o. female [facility] No LMP recorded. .    She presents for her annual checkup. She is having no significant problems. Personal hx breast cancer; estrogen negative; 9 years disease free  Menopausal now  16year old daughter in serious car accident; recovering  Serious back issues; steroid injections and PT; using ambien to sleep         Menstrual status:    Patient is no longer having cycles. She denies dysmenorrhea. She reports no premenstrual symptoms. Contraception:    The current method of family planning is none. Sexual history:    She  has no sexual activity history on file. Medical conditions:    Since her last annual GYN exam about two years ago, she has not the following changes in her health history: none. Pap and Mammogram History:    Her most recent Pap smear was normal, obtained 2 year(s) ago. The patient had a recent mammogram 10/2017 which was negative for malignancy. Breast Cancer History/Substance Abuse: negative    Osteoporosis History:    Family history does not include a first or second degree relative with osteopenia or osteoporosis. Past Medical History:   Diagnosis Date    Breast cancer ([facility] [address] 2009    dcis    Cancer [facility])     left breast cancer    Radiation therapy complication      Past Surgical History:   Procedure Laterality Date    HX BREAST LUMPECTOMY Left     HX GYN          IA MASTECTOMY, PARTIAL         Current Outpatient Prescriptions   Medication Sig Dispense Refill    zolpidem (AMBIEN) 10 mg tablet Take  by mouth nightly as needed for Sleep.  naproxen sodium 220 mg cap Take  by mouth.  docusate sodium (COLACE) 100 mg capsule Take 100 mg by mouth two (2) times a day.  loratadine 10 mg cap Take  by mouth.  valACYclovir (VALTREX) 500 mg tablet Take  by mouth two (2) times a day.       rizatriptan (MAXALT) 10 mg tablet Take 10 mg by mouth once as needed for Migraine. May repeat in 2 hours if needed      cholecalciferol, vitamin D3, (VITAMIN D3) 2,000 unit tab Take 2,000 Units by mouth daily. Allergies: Penicillin g     Tobacco History:  reports that she has never smoked. She has never used smokeless tobacco.  Alcohol Abuse:  reports that she drinks alcohol. Drug Abuse:  has no drug history on file.     Family Medical/Cancer History:   Family History   Problem Relation Age of Onset   Kevin Prior Cancer Father      Lung    Cancer Other      father history of colon polyps    Breast Cancer Other     Breast Cancer Maternal Aunt         Review of Systems - History obtained from the patient  Constitutional: negative for weight loss, fever, night sweats  HEENT: negative for hearing loss, earache, congestion, snoring, sorethroat  CV: negative for chest pain, palpitations, edema  Resp: negative for cough, shortness of breath, wheezing  GI: negative for change in bowel habits, abdominal pain, black or bloody stools  : negative for frequency, dysuria, hematuria, vaginal discharge  MSK: negative for back pain, joint pain, muscle pain  Breast: negative for breast lumps, nipple discharge, galactorrhea  Skin :negative for itching, rash, hives  Neuro: negative for dizziness, headache, confusion, weakness  Psych: negative for anxiety, depression, change in mood  Heme/lymph: negative for bleeding, bruising, pallor    Physical Exam    Visit Vitals    /64    Wt 129 lb 12.8 oz (58.9 kg)    BMI 21.6 kg/m2       Constitutional  · Appearance: well-nourished, well developed, alert, in no acute distress    HENT  · Head and Face: appears normal    Chest  · Respiratory Effort: breathing unlabored      Breasts  · Inspection of Breasts: breasts symmetrical, no skin changes, no discharge present, nipple appearance normal, no skin retraction present  · Palpation of Breasts and Axillae: no masses present on palpation, no breast tenderness  · Axillary Lymph Nodes: no lymphadenopathy present    Gastrointestinal  · Abdominal Examination: abdomen non-tender to palpation, normal bowel sounds, no masses present  · Liver and spleen: no hepatomegaly present, spleen not palpable  · Hernias: no hernias identified    Skin  · General Inspection: no rash, no lesions identified    Neurologic/Psychiatric  · Mental Status:  · Orientation: grossly oriented to person, place and time  · Mood and Affect: mood normal, affect appropriate    Genitourinary  · External Genitalia: normal appearance for age, no discharge present, no tenderness present, no inflammatory lesions present, no masses present, no atrophy present  · Vagina: normal vaginal vault without central or paravaginal defects, no discharge present, no inflammatory lesions present, no masses present  · Bladder: non-tender to palpation  · Urethra: appears normal  · Cervix: normal   · Uterus: normal size, shape and consistency  · Adnexa: no adnexal tenderness present, no adnexal masses present  · Perineum: perineum within normal limits, no evidence of trauma, no rashes or skin lesions present  · Anus: anus within normal limits, no hemorrhoids present  · Inguinal Lymph Nodes: no lymphadenopathy present    Assessment:  Routine gynecologic examination; menopausal  Personal hx breast cancer  Her current medical status is satisfactory with no evidence of significant gynecologic issues.     Plan:  Counseled re: diet, exercise, healthy lifestyle  Return for yearly wellness visits  Rec annual mammogram

## 2018-05-17 LAB
CYTOLOGIST CVX/VAG CYTO: NORMAL
CYTOLOGY CVX/VAG DOC THIN PREP: NORMAL
DX ICD CODE: NORMAL
LABCORP, 190119: NORMAL
Lab: NORMAL
OTHER STN SPEC: NORMAL
PATH REPORT.FINAL DX SPEC: NORMAL
STAT OF ADQ CVX/VAG CYTO-IMP: NORMAL

## 2018-10-24 ENCOUNTER — HOSPITAL ENCOUNTER (OUTPATIENT)
Dept: MAMMOGRAPHY | Age: 53
Discharge: HOME | End: 2018-10-24
Attending: RADIOLOGY
Payer: COMMERCIAL

## 2018-10-24 DIAGNOSIS — Z12.39 SCREENING BREAST EXAMINATION: ICD-10-CM

## 2018-10-24 PROCEDURE — 77063 BREAST TOMOSYNTHESIS BI: CPT

## 2019-01-14 ENCOUNTER — HOSPITAL ENCOUNTER (OUTPATIENT)
Dept: PHYSICAL THERAPY | Age: 54
Discharge: HOME | End: 2019-01-14
Payer: COMMERCIAL

## 2019-01-14 PROCEDURE — 97140 MANUAL THERAPY 1/> REGIONS: CPT | Performed by: PHYSICAL THERAPIST

## 2019-01-14 PROCEDURE — 97161 PT EVAL LOW COMPLEX 20 MIN: CPT | Performed by: PHYSICAL THERAPIST

## 2019-01-14 NOTE — PROGRESS NOTES
[facility] [employer] [facility] [address] [place], [address] [facility] Phone: [phone]  Fax: [phone] Plan of Care/Statement of Necessity for Physical Therapy Services  2-15 Patient name: Rolo Holman  : 1965  Provider#: [id number] Referral source: Susanne Torres MD     
Medical/Treatment Diagnosis: Lower back pain [M54.5] Prior Hospitalization: see medical history Comorbidities: Scoliosis, history of LBP, stenosis. Prior Level of Function: Able to sit, twist, lie down without increased LBP. Medications: Verified on Patient Summary List 
 
Start of Care: 19      Onset Date: 10 days ago The Plan of Care and following information is based on the information from the initial evaluation. Assessment/ key information: This patient presents with LBP, severe tenderness, pelvic mechanical dysfunction, decreased ROM, decreased strength, and impaired function. Evaluation Complexity History MEDIUM  Complexity : 1-2 comorbidities / personal factors will impact the outcome/ POC ; Examination LOW Complexity : 1-2 Standardized tests and measures addressing body structure, function, activity limitation and / or participation in recreation  ;Presentation LOW Complexity : Stable, uncomplicated  ;Clinical Decision Making MEDIUM Complexity : FOTO score of 26-74 Overall Complexity Rating: LOW Problem List: pain affecting function, decrease ROM, decrease strength, impaired gait/ balance, decrease ADL/ functional abilitiies, decrease activity tolerance and decrease flexibility/ joint mobility Treatment Plan may include any combination of the following: Therapeutic exercise, Therapeutic activities, Neuromuscular re-education, Physical agent/modality, Gait/balance training, Manual therapy, Patient education, Functional mobility training, Home safety training and Stair training Patient / Family readiness to learn indicated by: asking questions, trying to perform skills and interest 
Persons(s) to be included in education: patient (P) Barriers to Learning/Limitations: None Patient Goal (s): Re-align my back.  Patient Self Reported Health Status: fair Rehabilitation Potential: good Short Term Goals: To be accomplished in 4 treatments: 1. Moderate tenderness. 2.  Be able to sit for 20 minutes without increased pain. Long Term Goals: To be accomplished in 8-10 treatments: 1. Able to forward bend and touch fingertips to anterior ankle to improve functional bending. 2.  4+/5 hip strength to aid trunk stability needed to tolerate prolonged walking, twisting activities. 3.  Able to sit for 1 hour, lie on her back to go to sleep without increased back pain. Frequency / Duration: Patient to be seen 1-2 times per week for 8-10 treatments. Patient/ Caregiver education and instruction: exercises 
 
[x]  Plan of care has been reviewed with PTA Darryle Pummel V, PT 1/14/2019 6:54 PM 
 
________________________________________________________________________ I certify that the above Therapy Services are being furnished while the patient is under my care. I agree with the treatment plan and certify that this therapy is necessary. [de-identified] Signature:____________________  Date:____________Time: _________

## 2019-01-14 NOTE — PROGRESS NOTES
PT INITIAL EVALUATION NOTE 2-15 Patient Name: Shun Ochoa 
Date:2019 : 1965 [x]  Patient  Verified Payor: Mariam Harrell / Plan: Nicko Vargas / Product Type: PPO / In time: 4:05 PM  Out time: 5:05 PM 
Total Treatment Time (min): 60 Visit #: 1 Treatment Area: Lower back pain [M54.5] SUBJECTIVE Pain Level (0-10 scale): 1 Any medication changes, allergies to medications, adverse drug reactions, diagnosis change, or new procedure performed?: [] No    [x] Yes (see summary sheet for update) Subjective:    
History of scoliosis, lumbar stenosis. Has had back pain, radicular symptoms in the past.  PT here with good result, continued to do HEP and bought an recumbent bike with continued relief. Had been on Ambien to sleep, injections every 4 months to try and wean off the Ambien. Over the holidays, didn't exercise as much, may have bent and twisted, but had severe LBP, to the point she couldn't put her shoes on. Saw Dr. Yolande Vincent, who moved her injection up to 1/10 with relief. She feels better, but feels like she is out of alignment. If she twists she can feel it, but sitting is the worst.  Also has pain when she lies down to sleep. OBJECTIVE/EXAMINATION 
POSTURE:  Right ASIS and iliac crest low, possible right anterior innominate.  + scoliosis, decreased lumbar lordosis. AROM: (LUMBAR) FB:  Fingertips to mid shin BB:  Fulcrums L3/4 
SB R:  Limited SB L:  Limited STRENGTH:   R   L 
HIP FLEXORS:     4   4 HIP ABDUCTORS:  4-   4- 
QUADS:   5   5 HAMS:    5   4+ ANKLE DF:   5   5 ANKLE PF:   5   5 ANKLE CHRIS:  5   5 EHL:    4   4 FLEXIBILITY:   R   L 
HAMSTRINGS:  LBP   Slight HIP INT ROTATORS:  WNL   WNL 
HIP EXT ROTATORS  Hyper   Hyper SPECIAL TESTS: 
SLUMP SIGN:  + Right low back, negative left. STANDING FLEX TEST:  Inconclusive SLR:  + back pain left ESTHER'S TEST:  nt 
 
PALPATION:  Severe tenderness L>R quadratus lumborum muscles. 25 min Manual Therapy: MET with right hamstrings for right anterior innominate STM B quadratus lumborum muscles in prone Rationale: decrease pain, increase ROM and increase tissue extensibility  to improve the patients ability to sit, twist, lie down. With 
 [x] TE 
 [] TA 
 [] neuro 
 [] other: Patient Education: [x] Review HEP [] Progressed/Changed HEP based on:  
[] positioning   [] body mechanics   [] transfers   [] heat/ice application   
[x] other: Discussed current HEP, activities to avoid at this time. Other Objective/Functional Measures: 
 
Pain Level (0-10 scale) post treatment: 0 
 
ASSESSMENT:  
  
[x]  See Plan of Care Luisa Palomino V, PT 1/14/2019  4:04 PM

## 2019-01-24 ENCOUNTER — HOSPITAL ENCOUNTER (OUTPATIENT)
Dept: PHYSICAL THERAPY | Age: 54
Discharge: HOME | End: 2019-01-24
Payer: COMMERCIAL

## 2019-01-24 PROCEDURE — 97140 MANUAL THERAPY 1/> REGIONS: CPT | Performed by: PHYSICAL THERAPIST

## 2019-01-24 PROCEDURE — 97110 THERAPEUTIC EXERCISES: CPT | Performed by: PHYSICAL THERAPIST

## 2019-01-24 NOTE — PROGRESS NOTES
PT DAILY TREATMENT NOTE 2-15 Patient Name: Shun Ochoa 
Date:2019 : 1965 [x]  Patient  Verified Payor: Mariam Harrell / Plan: Nicko Vargas / Product Type: PPO / In time: 8:30 am  Out time: 9:10 am 
Total Treatment Time (min): 40 Visit #: 2 Treatment Area: Lower back pain [M54.5] SUBJECTIVE Pain Level (0-10 scale): 0 Any medication changes, allergies to medications, adverse drug reactions, diagnosis change, or new procedure performed?: [x] No    [] Yes (see summary sheet for update) Subjective functional status/changes:   [] No changes reported \"It feels pretty good. I haven't had any real issues. \" OBJECTIVE 
ASIS appear symmetric today Tenderness B quadratus lumborum muscles 20 min Therapeutic Exercise:  [x] See flow sheet : 
Added recumbent elliptical, ab bracing, piriformis and hamstring stretching Rationale: increase ROM and increase strength to improve the patients ability to sit, twist, lie down. 15 min Manual Therapy: STM B quadratus lumborum muscles in prone Rationale: decrease pain, increase ROM and increase tissue extensibility  to improve the patients ability to sit, twist, lie down. With 
 [] TE 
 [] TA 
 [] neuro 
 [] other: Patient Education: [x] Review HEP [] Progressed/Changed HEP based on:  
[] positioning   [] body mechanics   [] transfers   [] heat/ice application   
[] other:   
 
Other Objective/Functional Measures:   
 
Pain Level (0-10 scale) post treatment: 0 
 
ASSESSMENT/Changes in Function: Pelvic symmetry noted. Patient will continue to benefit from skilled PT services to modify and progress therapeutic interventions, address functional mobility deficits, address ROM deficits, address strength deficits, analyze and address soft tissue restrictions, analyze and cue movement patterns, analyze and modify body mechanics/ergonomics and assess and modify postural abnormalities to attain remaining goals. []  See Plan of Care 
[]  See progress note/recertification 
[]  See Discharge Summary Progress towards goals / Updated goals: PLAN [x]  Upgrade activities as tolerated     [x]  Continue plan of care 
[]  Update interventions per flow sheet      
[]  Discharge due to:_ 
[x]  Patient to return to the gym for cardio and progressive strengthening. Robbi Beckham V, PT 1/24/2019

## 2019-03-01 ENCOUNTER — HOSPITAL ENCOUNTER (OUTPATIENT)
Dept: PHYSICAL THERAPY | Age: 54
Discharge: HOME | End: 2019-03-01
Payer: COMMERCIAL

## 2019-03-01 PROCEDURE — 97140 MANUAL THERAPY 1/> REGIONS: CPT | Performed by: PHYSICAL THERAPIST

## 2019-03-01 PROCEDURE — 97110 THERAPEUTIC EXERCISES: CPT | Performed by: PHYSICAL THERAPIST

## 2019-03-01 NOTE — PROGRESS NOTES
PT PROGRESS NOTE 2-15 Patient Name: Gerry Sandoval 
Date:3/1/2019 : 1965 [x]  Patient  Verified Payor: Jeffrey Godinez / Plan: Ernie Expose / Product Type: PPO / In time: 11:30 am  Out time: 12:20 pm 
Total Treatment Time (min): 50 Visit #: 3 Treatment Area: Lower back pain [M54.5] SUBJECTIVE Pain Level (0-10 scale): 2 Any medication changes, allergies to medications, adverse drug reactions, diagnosis change, or new procedure performed?: [x] No    [] Yes (see summary sheet for update) Subjective functional status/changes:   [] No changes reported States has been doing her exercises on her own, but low back pain has returned to past few days. Is traveling for work so wanted to come in \"to check to see if I was off.\"  States sitting and driving tolerance was better until her back pain returned the past few days. OBJECTIVE Right ASIS low (possible right posterior innominate) FRS left L4/5 Moderate tenderness right SI joint and gluteals, mild tenderness QL muscle. 30 min Therapeutic Exercise:  [x] See flow sheet : 
  
Rationale: increase ROM and increase strength to improve the patients ability to sit, twist, lie down. 15 min Manual Therapy: MET with right hamstring for possible right pelvic innominate MET for FRS left L4/5 Rationale: decrease pain, increase ROM and increase tissue extensibility  to improve the patients ability to sit, twist, lie down. With 
 [] TE 
 [] TA 
 [] neuro 
 [] other: Patient Education: [x] Review HEP [] Progressed/Changed HEP based on:  
[] positioning   [] body mechanics   [] transfers   [] heat/ice application   
[] other:   
 
Other Objective/Functional Measures:   
 
Pain Level (0-10 scale) post treatment: 0 
 
ASSESSMENT/Changes in Function: Sacroiliac and lumbar mechanical dysfuncton noted.    
Patient will continue to benefit from skilled PT services to modify and progress therapeutic interventions, address functional mobility deficits, address ROM deficits, address strength deficits, analyze and address soft tissue restrictions, analyze and cue movement patterns, analyze and modify body mechanics/ergonomics and assess and modify postural abnormalities to attain remaining goals. []  See Plan of Care 
[]  See progress note/recertification 
[]  See Discharge Summary Progress towards goals / Updated goals: 
Short Term Goals: 1. Moderate tenderness. Met 2. Be able to sit for 20 minutes without increased pain. Partially met. PLAN [x]  Upgrade activities as tolerated     [x]  Continue plan of care 
[]  Update interventions per flow sheet      
[]  Discharge due to:_ 
[x]  Patient to travel next week and will continue PT when returns. Didi Montenegro V, PT 3/1/2019

## 2019-03-14 ENCOUNTER — HOSPITAL ENCOUNTER (OUTPATIENT)
Dept: PHYSICAL THERAPY | Age: 54
Discharge: HOME | End: 2019-03-14
Payer: COMMERCIAL

## 2019-03-14 PROCEDURE — 97110 THERAPEUTIC EXERCISES: CPT | Performed by: PHYSICAL THERAPIST

## 2019-03-14 PROCEDURE — 97140 MANUAL THERAPY 1/> REGIONS: CPT | Performed by: PHYSICAL THERAPIST

## 2019-03-14 NOTE — PROGRESS NOTES
PT DAILY TREATMENT NOTE 2-15    Patient Name: Kristi Brewer  Date:3/14/2019  : 1965  [x]  Patient  Verified  Payor: Debria Gaucher / Plan: Loni Hernández / Product Type: PPO /    In time: 7:30 am  Out time: 8:15 am  Total Treatment Time (min): 45  Visit #: 4    Treatment Area: Lower back pain [M54.5]    SUBJECTIVE  Pain Level (0-10 scale): 2  Any medication changes, allergies to medications, adverse drug reactions, diagnosis change, or new procedure performed?: [x] No    [] Yes (see summary sheet for update)  Subjective functional status/changes:   [] No changes reported  States was away for a few weeks. \"I was good until I came back. It feels like I'm off. Hurts on the outside of the right hip. \"    OBJECTIVE  Right ASIS low (possible right posterior innominate)  Severe tenderness right TFL, gluteus medius and piriformis muscles. 20 min Therapeutic Exercise:  [x] See flow sheet :     Rationale: increase ROM and increase strength to improve the patients ability to sit, twist, lie down. 25 min Manual Therapy:    MET with right hamstring for possible right pelvic innominate  Trigger point release right TFL, gluteus medius and piriformis muscles   Rationale: decrease pain, increase ROM and increase tissue extensibility  to improve the patients ability to sit, twist, lie down. With   [x] TE   [] TA   [] neuro   [] other: Patient Education: [x] Review HEP    [] Progressed/Changed HEP based on:   [] positioning   [] body mechanics   [] transfers   [] heat/ice application    [x] other: SI dysfunction and how it relates to muscular hip pain. Other Objective/Functional Measures:      Pain Level (0-10 scale) post treatment: 0    ASSESSMENT/Changes in Function: SI and muscular dysfunction causing right hip pain today.   Patient will continue to benefit from skilled PT services to modify and progress therapeutic interventions, address functional mobility deficits, address ROM deficits, address strength deficits, analyze and address soft tissue restrictions, analyze and cue movement patterns, analyze and modify body mechanics/ergonomics and assess and modify postural abnormalities to attain remaining goals. []  See Plan of Care  []  See progress note/recertification  []  See Discharge Summary         Progress towards goals / Updated goals:  Short Term Goals:   1. Moderate tenderness. Met  2. Be able to sit for 20 minutes without increased pain. Partially met. PLAN  [x]  Upgrade activities as tolerated     [x]  Continue plan of care  []  Update interventions per flow sheet       []  Discharge due to:_  [x]  Continue weekly. Patient to continue with daily HEP.     Jony Priutt V, PT 3/14/2019

## 2019-03-19 ENCOUNTER — HOSPITAL ENCOUNTER (OUTPATIENT)
Dept: PHYSICAL THERAPY | Age: 54
Discharge: HOME | End: 2019-03-19
Payer: COMMERCIAL

## 2019-03-19 PROCEDURE — 97140 MANUAL THERAPY 1/> REGIONS: CPT | Performed by: PHYSICAL THERAPIST

## 2019-03-19 PROCEDURE — 97110 THERAPEUTIC EXERCISES: CPT | Performed by: PHYSICAL THERAPIST

## 2019-03-19 NOTE — PROGRESS NOTES
PT DAILY TREATMENT NOTE 2-15    Patient Name: Tamela Harris  Date:3/19/2019  : 1965  [x]  Patient  Verified  Payor: Radha Alexander / Plan: Oleg Profit / Product Type: PPO /    In time: 7:30 am  Out time: 8:15 am  Total Treatment Time (min): 45  Visit #: 5    Treatment Area: Lower back pain [M54.5]    SUBJECTIVE  Pain Level (0-10 scale): 2  Any medication changes, allergies to medications, adverse drug reactions, diagnosis change, or new procedure performed?: [x] No    [] Yes (see summary sheet for update)  Subjective functional status/changes:   [] No changes reported  \"It feels off. I tried to do what you showed me, but it doesn't do as well as when you do it. \"    OBJECTIVE  Right malleolus inferior  Right ASIS low (possible right posterior innominate)  Severe tenderness right TFL, gluteus medius and piriformis muscles. 20 min Therapeutic Exercise:  [x] See flow sheet :     Rationale: increase ROM and increase strength to improve the patients ability to sit, twist, lie down. 25 min Manual Therapy:    MET with right hamstring for possible right pelvic innominate  Trigger point release right TFL, gluteus medius and piriformis muscles   Rationale: decrease pain, increase ROM and increase tissue extensibility  to improve the patients ability to sit, twist, lie down. With   [x] TE   [] TA   [] neuro   [] other: Patient Education: [x] Review HEP    [] Progressed/Changed HEP based on:   [] positioning   [] body mechanics   [] transfers   [] heat/ice application    [] other:      Other Objective/Functional Measures:      Pain Level (0-10 scale) post treatment: 0    ASSESSMENT/Changes in Function: Improved mechanics with decreased pain with manual treatment.   Patient will continue to benefit from skilled PT services to modify and progress therapeutic interventions, address functional mobility deficits, address ROM deficits, address strength deficits, analyze and address soft tissue restrictions, analyze and cue movement patterns, analyze and modify body mechanics/ergonomics and assess and modify postural abnormalities to attain remaining goals. []  See Plan of Care  []  See progress note/recertification  []  See Discharge Summary         Progress towards goals / Updated goals:  Short Term Goals:   1. Moderate tenderness. Met  2. Be able to sit for 20 minutes without increased pain. Partially met. PLAN  [x]  Upgrade activities as tolerated     [x]  Continue plan of care  []  Update interventions per flow sheet       []  Discharge due to:_  [x]  Continue weekly. Patient to continue with daily HEP.     Didi Montenegro V, PT 3/19/2019

## 2019-03-26 ENCOUNTER — HOSPITAL ENCOUNTER (OUTPATIENT)
Dept: PHYSICAL THERAPY | Age: 54
Discharge: HOME | End: 2019-03-26
Payer: COMMERCIAL

## 2019-03-26 PROCEDURE — 97110 THERAPEUTIC EXERCISES: CPT | Performed by: PHYSICAL THERAPIST

## 2019-03-26 PROCEDURE — 97140 MANUAL THERAPY 1/> REGIONS: CPT | Performed by: PHYSICAL THERAPIST

## 2019-04-02 ENCOUNTER — APPOINTMENT (OUTPATIENT)
Dept: PHYSICAL THERAPY | Age: 54
End: 2019-04-02
Payer: COMMERCIAL

## 2019-04-18 NOTE — PROGRESS NOTES
PT DAILY TREATMENT NOTE 2-15    Patient Name: Diane Mock  Date:2017  : 1965  [x]  Patient  Verified  Payor: Rashida Ship / Plan: Suárez Shows / Product Type: PPO /    In time: 1200 PM  Out time:120 PM  Total Treatment Time (min): 80  Visit #: 5    Treatment Area: Low back pain [M54.5]    SUBJECTIVE  Pain Level (0-10 scale): 2-3  Any medication changes, allergies to medications, adverse drug reactions, diagnosis change, or new procedure performed?: [x] No    [] Yes (see summary sheet for update)  Subjective functional status/changes:   [] No changes reported    Overall, doing much better. Able to turn over in bed with greater ease. I'm in more pain today, because I had to drive and sit for a lot longer than I normally do the past 3 days. I also got a massage which I never do, which made me super sore and painful.       OBJECTIVE    Modality rationale: decrease inflammation and decrease pain to improve the patients ability to sleep   Min Type Additional Details    [] Estim: []Att   []Unatt        []TENS instruct                  []IFC  []Premod   []NMES                     []Other:  []w/US   []w/ice   []w/heat  Position:  Location:    []  Traction: [] Cervical       []Lumbar                       [] Prone          []Supine                       []Intermittent   []Continuous Lbs:  [] before manual  [] after manual  []w/heat    []  Ultrasound: []Continuous   [] Pulsed at:                            []1MHz   []3MHz Location:  W/cm2:    []  Paraffin         Location:  []w/heat   10 [x]  Ice     []  Heat OMIT per pt request as she has to go back to work  []  Ice massage Position:  Hook lying  Location:  Low back    []  Laser  []  Other: Position:  Location:    []  Vasopneumatic Device Pressure:       [] lo [] med [] hi   Temperature:    [x] Skin assessment post-treatment:  [x]intact []redness- no adverse reaction    []redness  adverse reaction:     55 min Therapeutic Exercise: [x] See flow sheet :     Rationale: increase ROM and increase strength to improve the patients ability to sleep    25 min Manual Therapy:    Trigger point release B gluteus medius and piriformis muscles  STM right QL muscle  Manual right L4/5 facet stretch in left side lying-omit   Rationale: decrease pain, increase ROM, increase tissue extensibility and decrease trigger points  to improve the patients ability to sleep. With   [x] TE   [] TA   [] neuro   [] other: Patient Education: [x] Review HEP/written handout    [] Progressed/Changed HEP based on:   [] positioning   [] body mechanics   [] transfers   [] heat/ice application    [] other:      Other Objective/Functional Measures:      Pain Level (0-10 scale) post treatment: 0    ASSESSMENT/Changes in Function:      Patient will continue to benefit from skilled PT services to modify and progress therapeutic interventions, address functional mobility deficits, address ROM deficits, address strength deficits, analyze and address soft tissue restrictions, analyze and cue movement patterns, analyze and modify body mechanics/ergonomics and assess and modify postural abnormalities to attain remaining goals. []  See Plan of Care  []  See progress note/recertification  []  See Discharge Summary         Progress towards goals / Updated goals:  Pt jerica addition of core ex's to HEP without increased pain. Pt with great difficulty activating TrA musculature today. Advised pt to do HEP 1x/day so long as able to jerica ex's without inc pain this evening. PLAN  [x]  Upgrade activities as tolerated     [x]  Continue plan of care  []  Update interventions per flow sheet       []  Discharge due to:_  []  Other:_      Ginna Staton.  Vanessa PT 8/25/2017  8:17 AM unk

## 2019-04-19 ENCOUNTER — HOSPITAL ENCOUNTER (OUTPATIENT)
Dept: PHYSICAL THERAPY | Age: 54
Discharge: HOME | End: 2019-04-19
Payer: COMMERCIAL

## 2019-04-19 PROCEDURE — 97110 THERAPEUTIC EXERCISES: CPT | Performed by: PHYSICAL THERAPIST

## 2019-04-19 PROCEDURE — 97140 MANUAL THERAPY 1/> REGIONS: CPT | Performed by: PHYSICAL THERAPIST

## 2019-04-19 NOTE — PROGRESS NOTES
PT DAILY TREATMENT NOTE 2-15 Patient Name: Christa Phipps 
CKFS: : 1965 [x]  Patient  Verified Payor: Kirill Leon / Plan: Radha Cali / Product Type: PPO / In time: 9:00 am  Out time: 10:00 am 
Total Treatment Time (min): 60 Visit #: 7 Treatment Area: Lower back pain [M54.5] SUBJECTIVE Pain Level (0-10 scale): 4 Any medication changes, allergies to medications, adverse drug reactions, diagnosis change, or new procedure performed?: [x] No    [] Yes (see summary sheet for update) Subjective functional status/changes:   [] No changes reported \"It was good until I started traveling. Now I'm all screwed up. \" OBJECTIVE Right ASIS low (possible right posterior innominate) FRS left L3/4 
 
35 min Therapeutic Exercise:  [x] See flow sheet : 
  
Rationale: increase ROM and increase strength to improve the patients ability to sit, twist, lie down. 20 min Manual Therapy: MET with right hamstring for possible right pelvic innominate MET for FRS left L3/4 STM B QL muscles Rationale: decrease pain, increase ROM and increase tissue extensibility  to improve the patients ability to sit, twist, lie down. With 
 [x] TE 
 [] TA 
 [] neuro 
 [] other: Patient Education: [x] Review HEP [] Progressed/Changed HEP based on:  
[] positioning   [] body mechanics   [] transfers   [] heat/ice application   
[] other:   
 
Other Objective/Functional Measures:   
 
Pain Level (0-10 scale) post treatment: 0 
 
ASSESSMENT/Changes in Function: SI and lumbar rotational dysfunction, soft tissue dysfunction all contributing to symptoms.  
Patient will continue to benefit from skilled PT services to modify and progress therapeutic interventions, address functional mobility deficits, address ROM deficits, address strength deficits, analyze and address soft tissue restrictions, analyze and cue movement patterns, analyze and modify body mechanics/ergonomics and assess and modify postural abnormalities to attain remaining goals. []  See Plan of Care 
[]  See progress note/recertification 
[]  See Discharge Summary Progress towards goals / Updated goals: 
Short Term Goals: 1. Moderate tenderness. Met 2. Be able to sit for 20 minutes without increased pain. Partially met. PLAN [x]  Upgrade activities as tolerated     [x]  Continue plan of care 
[]  Update interventions per flow sheet      
[]  Discharge due to:_ 
[x]  Continue weekly. Patient to continue with daily HEP. Hasmukh Gomez V, PT 4/19/2019

## 2019-04-26 ENCOUNTER — HOSPITAL ENCOUNTER (OUTPATIENT)
Dept: PHYSICAL THERAPY | Age: 54
Discharge: HOME | End: 2019-04-26
Payer: COMMERCIAL

## 2019-04-26 PROCEDURE — 97140 MANUAL THERAPY 1/> REGIONS: CPT | Performed by: PHYSICAL THERAPIST

## 2019-04-26 PROCEDURE — 97110 THERAPEUTIC EXERCISES: CPT | Performed by: PHYSICAL THERAPIST

## 2019-04-26 NOTE — PROGRESS NOTES
PT DAILY TREATMENT NOTE 2-15 Patient Name: Felicitas Peralta 
Date:2019 : 1965 [x]  Patient  Verified Payor: Sonali Villafana / Plan: Stacie Guadarrama / Product Type: PPO / In time: 7:35 am  Out time: 8:35 am 
Total Treatment Time (min): 60 Visit #: 8 Treatment Area: Lower back pain [M54.5] SUBJECTIVE Pain Level (0-10 scale): 3-4 Any medication changes, allergies to medications, adverse drug reactions, diagnosis change, or new procedure performed?: [x] No    [] Yes (see summary sheet for update) Subjective functional status/changes:   [] No changes reported \"It feels about the same. What can I do moving forward? \"  Complains of right sided low back pain which radiates into her right pelvis. OBJECTIVE Right ASIS low (possible right posterior innominate) FRS left L3/4 Segmental extension and rotation noted L3/4, L4/5 with prone hip extension exercises 35 min Therapeutic Exercise:  [x] See flow sheet : 
Added prone hip extension stabilization exercise Rationale: increase ROM and increase strength to improve the patients ability to sit, twist, lie down. 25 min Manual Therapy: MET with right hamstring for possible right pelvic innominate MET for FRS left L3/4 STM R QL muscle Rationale: decrease pain, increase ROM and increase tissue extensibility  to improve the patients ability to sit, twist, lie down. With 
 [x] TE 
 [] TA 
 [] neuro 
 [] other: Patient Education: [x] Review HEP [] Progressed/Changed HEP based on:  
[] positioning   [] body mechanics   [] transfers   [] heat/ice application   
[] other:   
 
Other Objective/Functional Measures:   
 
Pain Level (0-10 scale) post treatment: 0 
 
ASSESSMENT/Changes in Function: SI and lumbar rotational dysfunction, instability noted with prone hip extension. Decreased back pain with manual therapy.  
Patient will continue to benefit from skilled PT services to modify and progress therapeutic interventions, address functional mobility deficits, address ROM deficits, address strength deficits, analyze and address soft tissue restrictions, analyze and cue movement patterns, analyze and modify body mechanics/ergonomics and assess and modify postural abnormalities to attain remaining goals. []  See Plan of Care 
[]  See progress note/recertification 
[]  See Discharge Summary Progress towards goals / Updated goals: 
Short Term Goals: 1. Moderate tenderness. Met 2. Be able to sit for 20 minutes without increased pain. Partially met. PLAN [x]  Upgrade activities as tolerated     [x]  Continue plan of care 
[]  Update interventions per flow sheet      
[]  Discharge due to:_ 
[x]  Continue weekly. Patient to continue with daily HEP. Abimael Kaplan V, PT 4/26/2019

## 2019-05-06 ENCOUNTER — HOSPITAL ENCOUNTER (OUTPATIENT)
Dept: CT IMAGING | Age: 54
Discharge: HOME | End: 2019-05-06
Payer: SELF-PAY

## 2019-05-06 DIAGNOSIS — Z29.9 PREVENTIVE MEASURE: ICD-10-CM

## 2019-05-06 PROCEDURE — 75571 CT HRT W/O DYE W/CA TEST: CPT

## 2019-05-07 ENCOUNTER — APPOINTMENT (OUTPATIENT)
Dept: PHYSICAL THERAPY | Age: 54
End: 2019-05-07
Payer: COMMERCIAL

## 2019-05-10 ENCOUNTER — HOSPITAL ENCOUNTER (OUTPATIENT)
Dept: PHYSICAL THERAPY | Age: 54
Discharge: HOME | End: 2019-05-10
Payer: COMMERCIAL

## 2019-05-10 PROCEDURE — 97140 MANUAL THERAPY 1/> REGIONS: CPT | Performed by: PHYSICAL THERAPIST

## 2019-05-10 PROCEDURE — 97110 THERAPEUTIC EXERCISES: CPT | Performed by: PHYSICAL THERAPIST

## 2019-05-10 NOTE — PROGRESS NOTES
PT DAILY TREATMENT NOTE 2-15 Patient Name: Christa Phipps 
Date:5/10/2019 : 1965 [x]  Patient  Verified Payor: Kirill Leon / Plan: Radha Cali / Product Type: PPO / In time: 7:30 am  Out time: 8:30 am 
Total Treatment Time (min): 60 Visit #: 7 Treatment Area: Lower back pain [M54.5] SUBJECTIVE Pain Level (0-10 scale): Any medication changes, allergies to medications, adverse drug reactions, diagnosis change, or new procedure performed?: [x] No    [] Yes (see summary sheet for update) Subjective functional status/changes:   [] No changes reported \"I got my shot last week. It didn't help as much as it did before. I don't have the pain down my leg but I still have pain across my lower back. \" OBJECTIVE 
ASIS landmarks = 
FRS left L3/4 Severe tenderness B QL muscles 35 min Therapeutic Exercise:  [x] See flow sheet :  
Rationale: increase ROM and increase strength to improve the patients ability to sit, twist, lie down. 25 min Manual Therapy: MET for FRS left L3/4 STM B QL muscles Rationale: decrease pain, increase ROM and increase tissue extensibility  to improve the patients ability to sit, twist, lie down. With 
 [x] TE 
 [] TA 
 [] neuro 
 [] other: Patient Education: [x] Review HEP [] Progressed/Changed HEP based on:  
[] positioning   [] body mechanics   [] transfers   [] heat/ice application   
[] other:   
 
Other Objective/Functional Measures:   
 
Pain Level (0-10 scale) post treatment: 0 
 
ASSESSMENT/Changes in Function: Lumbar rotational and muscular dysfunction causing symptoms. Decreased LBP with therapy.  
Patient will continue to benefit from skilled PT services to modify and progress therapeutic interventions, address functional mobility deficits, address ROM deficits, address strength deficits, analyze and address soft tissue restrictions, analyze and cue movement patterns, analyze and modify body mechanics/ergonomics and assess and modify postural abnormalities to attain remaining goals. []  See Plan of Care 
[]  See progress note/recertification 
[]  See Discharge Summary Progress towards goals / Updated goals: 
Short Term Goals: 1. Moderate tenderness. Met 2. Be able to sit for 20 minutes without increased pain. Partially met. PLAN [x]  Upgrade activities as tolerated     [x]  Continue plan of care 
[]  Update interventions per flow sheet      
[]  Discharge due to:_ 
[x]  Continue weekly. Patient to continue with daily HEP. Camila Cross V, PT 5/10/2019

## 2019-05-10 NOTE — CARDIO/PULMONARY
Reached patient at her given mobile number and shared her coronary artery CT score of zero with her. Discussed the meaning of this score. We also discussed the lifestyle management book resources offered by Dr. Luis Angel Vargas as patient has concerns about her increasing cholesterol levels. Patient has no further questions at this time.

## 2019-05-17 ENCOUNTER — HOSPITAL ENCOUNTER (OUTPATIENT)
Dept: PHYSICAL THERAPY | Age: 54
Discharge: HOME | End: 2019-05-17
Payer: COMMERCIAL

## 2019-05-17 PROCEDURE — 97140 MANUAL THERAPY 1/> REGIONS: CPT | Performed by: PHYSICAL THERAPIST

## 2019-05-17 PROCEDURE — 97110 THERAPEUTIC EXERCISES: CPT | Performed by: PHYSICAL THERAPIST

## 2019-05-17 NOTE — PROGRESS NOTES
PT DAILY TREATMENT NOTE 2-15 Patient Name: Soy Raw 
Date:2019 : 1965 [x]  Patient  Verified Payor: Attila Bustos / Plan: Drea Amend / Product Type: PPO / In time: 7:30 am  Out time: 8:30 am 
Total Treatment Time (min): 60 Visit #: 86 Treatment Area: Lower back pain [M54.5] SUBJECTIVE Pain Level (0-10 scale): Any medication changes, allergies to medications, adverse drug reactions, diagnosis change, or new procedure performed?: [x] No    [] Yes (see summary sheet for update) Subjective functional status/changes: \"I exercised while I was traveling. The bike seat was uncomfortable so the middle of my vack has been hurting. \" OBJECTIVE 
ASIS landmarks = 
FRS left L2/3>L3/4 Tenderness over L2,3,4 spinous processes Tenderness B lumbar multifidi muscles 35 min Therapeutic Exercise:  [x] See flow sheet :  
Rationale: increase ROM and increase strength to improve the patients ability to sit, twist, lie down. 20 min Manual Therapy: MET for FRS left L2/3 and L3/4 STM B lumbar multifidi muscles Rationale: decrease pain, increase ROM and increase tissue extensibility  to improve the patients ability to sit, twist, lie down. With 
 [x] TE 
 [] TA 
 [] neuro 
 [] other: Patient Education: [x] Review HEP [] Progressed/Changed HEP based on:  
[] positioning   [] body mechanics   [] transfers   [] heat/ice application   
[] other:   
 
Other Objective/Functional Measures:   
 
Pain Level (0-10 scale) post treatment: 0 
 
ASSESSMENT/Changes in Function: Decreased pain with treatment. Lumbar joint rotation and muscular dysfunction noted.  
Patient will continue to benefit from skilled PT services to modify and progress therapeutic interventions, address functional mobility deficits, address ROM deficits, address strength deficits, analyze and address soft tissue restrictions, analyze and cue movement patterns, analyze and modify body mechanics/ergonomics and assess and modify postural abnormalities to attain remaining goals. []  See Plan of Care 
[]  See progress note/recertification 
[]  See Discharge Summary Progress towards goals / Updated goals: 
Short Term Goals: 1. Moderate tenderness. Met 2. Be able to sit for 20 minutes without increased pain. Partially met. PLAN [x]  Upgrade activities as tolerated     [x]  Continue plan of care 
[]  Update interventions per flow sheet      
[]  Discharge due to:_ 
[x]  Continue weekly. Patient to continue with daily HEP. Melany Lara V, PT 5/17/2019

## 2019-05-23 ENCOUNTER — HOSPITAL ENCOUNTER (OUTPATIENT)
Dept: PHYSICAL THERAPY | Age: 54
Discharge: HOME | End: 2019-05-23
Payer: COMMERCIAL

## 2019-05-23 PROCEDURE — 97140 MANUAL THERAPY 1/> REGIONS: CPT | Performed by: PHYSICAL THERAPIST

## 2019-05-23 PROCEDURE — 97110 THERAPEUTIC EXERCISES: CPT | Performed by: PHYSICAL THERAPIST

## 2019-05-23 NOTE — PROGRESS NOTES
PT DAILY TREATMENT NOTE 2-15    Patient Name: Cindy Green  Date:2019  : 1965  [x]  Patient  Verified  Payor: Ana Armstrong / Plan: Olegario Johnson / Product Type: PPO /    In time: 7:30 am  Out time: 8:35 am  Total Treatment Time (min): 65  Visit #: 11    Treatment Area: Lower back pain [M54.5]    SUBJECTIVE  Pain Level (0-10 scale): 0  Any medication changes, allergies to medications, adverse drug reactions, diagnosis change, or new procedure performed?: [x] No    [] Yes (see summary sheet for update)  Subjective functional status/changes:     \"I feel really good today. \"    OBJECTIVE  ASIS landmarks =  FRS left L2/3>L3/4    40 min Therapeutic Exercise:  [x] See flow sheet :   Rationale: increase ROM and increase strength to improve the patients ability to sit, twist, lie down. 20 min Manual Therapy:    MET for FRS left L2/3 and L3/4   Rationale: decrease pain, increase ROM and increase tissue extensibility  to improve the patients ability to sit, twist, lie down. With   [x] TE   [] TA   [] neuro   [] other: Patient Education: [x] Review HEP    [] Progressed/Changed HEP based on:   [] positioning   [] body mechanics   [] transfers   [] heat/ice application    [] other:      Other Objective/Functional Measures:      Pain Level (0-10 scale) post treatment: 0    ASSESSMENT/Changes in Function: Decreased LBP overall. Lumbar rotational dysfunction noted, but clears with manual therapy. Patient will continue to benefit from skilled PT services to modify and progress therapeutic interventions, address functional mobility deficits, address ROM deficits, address strength deficits, analyze and address soft tissue restrictions, analyze and cue movement patterns, analyze and modify body mechanics/ergonomics and assess and modify postural abnormalities to attain remaining goals.      []  See Plan of Care  []  See progress note/recertification  []  See Discharge Summary Progress towards goals / Updated goals:    PLAN  [x]  Upgrade activities as tolerated     [x]  Continue plan of care  []  Update interventions per flow sheet       []  Discharge due to:_  [x]  Re-assess for possible discharge if still doing well next visit.     Homer Sommers V, PT 5/23/2019

## 2019-05-31 ENCOUNTER — HOSPITAL ENCOUNTER (OUTPATIENT)
Dept: PHYSICAL THERAPY | Age: 54
Discharge: HOME | End: 2019-05-31
Payer: COMMERCIAL

## 2019-05-31 PROCEDURE — 97110 THERAPEUTIC EXERCISES: CPT | Performed by: PHYSICAL THERAPIST

## 2019-05-31 PROCEDURE — 97140 MANUAL THERAPY 1/> REGIONS: CPT | Performed by: PHYSICAL THERAPIST

## 2019-05-31 NOTE — PROGRESS NOTES
PT PROGRESS NOTE 2-15    Patient Name: Soy Raw  Date:2019  : 1965  [x]  Patient  Verified  Payor: Attila Bustos / Plan: Drea Amend / Product Type: PPO /    In time: 8:00 am  Out time: 8:50 am  Total Treatment Time (min): 50  Visit #: 11    Treatment Area: Lower back pain [M54.5]    SUBJECTIVE  Pain Level (0-10 scale): 0  Any medication changes, allergies to medications, adverse drug reactions, diagnosis change, or new procedure performed?: [x] No    [] Yes (see summary sheet for update)  Subjective functional status/changes:     \"I feel great. \"    Function:    Able to forward bend without back pain. Able to lie supine at night to sleep without back pain. Able to sit unlimited time frame without back pain. OBJECTIVE  Landmarks:  ASIS landmarks =  FRS left L3/4    AROM: (LUMBAR)  FB:  Fingertips to anterior shin (hamstring tightness)    STRENGTH:   R   L  HIP FLEXORS:     4+   4  HIP ABDUCTORS:  5-   5-    40 min Therapeutic Exercise:  [x] See flow sheet :   Rationale: increase ROM and increase strength to improve the patients ability to sit, twist, lie down. 20 min Manual Therapy:    MET for FRS left L3/4   Rationale: decrease pain, increase ROM and increase tissue extensibility  to improve the patients ability to sit, twist, lie down. With   [x] TE   [] TA   [] neuro   [] other: Patient Education: [x] Review HEP    [] Progressed/Changed HEP based on:   [] positioning   [] body mechanics   [] transfers   [] heat/ice application    [] other:      Other Objective/Functional Measures:      Pain Level (0-10 scale) post treatment: 0    ASSESSMENT/Changes in Function: Patient has made good progress in physical therapy. Presents with improved pelvic symmetry, increased ROM, strength and improved function. Mild lumbar rotational findings today. Progress towards goals / Updated goals:  Long Term Goals:   1.   Able to forward bend and touch fingertips to anterior ankle to improve functional bending. Met  2.  4+/5 hip strength to aid trunk stability needed to tolerate prolonged walking, twisting activities. Met except left hip flexor  3. Able to sit for 1 hour, lie on her back to go to sleep without increased back pain. Met    PLAN     [x]  Discharge due to:  Goals met/progressing towards. [x]  Continue with HEP/gym workouts.     Ron Williamson, PT 5/31/2019

## 2019-06-03 NOTE — ANCILLARY DISCHARGE INSTRUCTIONS
PT DISCHARGE NOTE 2-15    Patient Name: Stiven Lipscomb  Date:6/3/2019  : 1965  [x]  Patient  Verified  Payor: Sultana Mitchell / Plan: Barrie Schilder / Product Type: PPO /      Treatment Area: Lower back pain [M54.5]    SUBJECTIVE  Pain Level (0-10 scale): 0  Subjective functional status/changes:     \"I feel great. \"    Function:    Able to forward bend without back pain. Able to lie supine at night to sleep without back pain. Able to sit unlimited time frame without back pain. OBJECTIVE  Landmarks:  ASIS landmarks =  FRS left L3/4    AROM: (LUMBAR)  FB:  Fingertips to anterior shin (hamstring tightness)    STRENGTH:   R   L  HIP FLEXORS:     4+   4  HIP ABDUCTORS:  5-   5-          With   [x] TE   [] TA   [] neuro   [] other: Patient Education: [x] Review HEP    [] Progressed/Changed HEP based on:   [] positioning   [] body mechanics   [] transfers   [] heat/ice application    [] other:          Pain Level (0-10 scale) post treatment: 0    ASSESSMENT/Changes in Function: Patient has made good progress in physical therapy. Presents with improved pelvic symmetry, increased ROM, strength and improved function. Mild lumbar rotational findings today. Progress towards goals / Updated goals:  Long Term Goals:   1. Able to forward bend and touch fingertips to anterior ankle to improve functional bending. Met  2.  4+/5 hip strength to aid trunk stability needed to tolerate prolonged walking, twisting activities. Met except left hip flexor  3. Able to sit for 1 hour, lie on her back to go to sleep without increased back pain. Met    PLAN     [x]  Discharge due to:  Goals met/progressing towards. [x]  Continue with HEP/gym workouts.     Cheryle Rogers V, PT 6/3/2019

## 2019-10-03 ENCOUNTER — OFFICE VISIT (OUTPATIENT)
Dept: OBGYN CLINIC | Age: 54
End: 2019-10-03

## 2019-10-03 VITALS
HEIGHT: 65 IN | BODY MASS INDEX: 21.29 KG/M2 | SYSTOLIC BLOOD PRESSURE: 100 MMHG | WEIGHT: 127.8 LBS | DIASTOLIC BLOOD PRESSURE: 70 MMHG

## 2019-10-03 DIAGNOSIS — Z01.419 WELL WOMAN EXAM WITH ROUTINE GYNECOLOGICAL EXAM: Primary | ICD-10-CM

## 2019-10-03 RX ORDER — IBUPROFEN 200 MG
TABLET ORAL
COMMUNITY
End: 2021-01-06 | Stop reason: ALTCHOICE

## 2019-10-03 RX ORDER — MECLIZINE HYDROCHLORIDE 25 MG/1
TABLET ORAL
COMMUNITY
End: 2021-01-06 | Stop reason: ALTCHOICE

## 2019-10-03 NOTE — PATIENT INSTRUCTIONS
Well Visit, Women 48 to 72: Care Instructions  Your Care Instructions    Physical exams can help you stay healthy. Your doctor has checked your overall health and may have suggested ways to take good care of yourself. He or she also may have recommended tests. At home, you can help prevent illness with healthy eating, regular exercise, and other steps. Follow-up care is a key part of your treatment and safety. Be sure to make and go to all appointments, and call your doctor if you are having problems. It's also a good idea to know your test results and keep a list of the medicines you take. How can you care for yourself at home? · Reach and stay at a healthy weight. This will lower your risk for many problems, such as obesity, diabetes, heart disease, and high blood pressure. · Get at least 30 minutes of exercise on most days of the week. Walking is a good choice. You also may want to do other activities, such as running, swimming, cycling, or playing tennis or team sports. · Do not smoke. Smoking can make health problems worse. If you need help quitting, talk to your doctor about stop-smoking programs and medicines. These can increase your chances of quitting for good. · Protect your skin from too much sun. When you're outdoors from 10 a.m. to 4 p.m., stay in the shade or cover up with clothing and a hat with a wide brim. Wear sunglasses that block UV rays. Even when it's cloudy, put broad-spectrum sunscreen (SPF 30 or higher) on any exposed skin. · See a dentist one or two times a year for checkups and to have your teeth cleaned. · Wear a seat belt in the car. Follow your doctor's advice about when to have certain tests. These tests can spot problems early. · Cholesterol. Your doctor will tell you how often to have this done based on your age, family history, or other things that can increase your risk for heart attack and stroke. · Blood pressure.  Have your blood pressure checked during a routine doctor visit. Your doctor will tell you how often to check your blood pressure based on your age, your blood pressure results, and other factors. · Mammogram. Ask your doctor how often you should have a mammogram, which is an X-ray of your breasts. A mammogram can spot breast cancer before it can be felt and when it is easiest to treat. · Pap test and pelvic exam. Ask your doctor how often you should have a Pap test. You may not need to have a Pap test as often as you used to. · Vision. Have your eyes checked every year or two or as often as your doctor suggests. Some experts recommend that you have yearly exams for glaucoma and other age-related eye problems starting at age 48. · Hearing. Tell your doctor if you notice any change in your hearing. You can have tests to find out how well you hear. · Diabetes. Ask your doctor whether you should have tests for diabetes. · Colorectal cancer. Your risk for colorectal cancer gets higher as you get older. Some experts say that adults should start regular screening at age 48 and stop at age 76. Others say to start before age 48 or continue after age 76. Talk with your doctor about your risk and when to start and stop screening. · Thyroid disease. Talk to your doctor about whether to have your thyroid checked as part of a regular physical exam. Women have an increased chance of a thyroid problem. · Osteoporosis. You should begin tests for bone density at age 72. If you are younger than 72, ask your doctor whether you have factors that may increase your risk for this disease. You may want to have this test before age 72. · Heart attack and stroke risk. At least every 4 to 6 years, you should have your risk for heart attack and stroke assessed. Your doctor uses factors such as your age, blood pressure, cholesterol, and whether you smoke or have diabetes to show what your risk for a heart attack or stroke is over the next 10 years.   When should you call for help?  Watch closely for changes in your health, and be sure to contact your doctor if you have any problems or symptoms that concern you. Where can you learn more? Go to http://yomi-rosalino.info/. Enter T678 in the search box to learn more about \"Well Visit, Women 50 to 72: Care Instructions. \"  Current as of: December 13, 2018  Content Version: 12.2  © 2201-1933 [employer], [employer]. Care instructions adapted under license by [employer] (which disclaims liability or warranty for this information). If you have questions about a medical condition or this instruction, always ask your healthcare professional. [address] any warranty or liability for your use of this information.

## 2019-10-03 NOTE — PROGRESS NOTES
Annual exam ages 40-58    Sofia Clifton is a No obstetric history on file. ,  47 y.o. female [facility] No LMP recorded. .    She presents for her annual checkup. She is having no significant problems. No bleeding for past 6m but only occas hot flash; otherwise asx; perimenopause reviewed    History of uterine fibroids, no recent workup but also asx         Menstrual status:    Her periods are absent in flow. No menses x 5 months. Perimenopause. She denies dysmenorrhea. She reports no premenstrual symptoms. Contraception:    The current method of family planning is vasectomy. Sexual history:    She  has no sexual activity history on file. Medical conditions:    Since her last annual GYN exam about one year ago, she has not the following changes in her health history: none. Pap and Mammogram History:    Her most recent Pap smear was normal, obtained 1 year(s) ago. The patient had a recent mammogram 10/2018 which was negative for malignancy. Breast Cancer History/Substance Abuse: negative    Osteoporosis History:    Family history does not include a first or second degree relative with osteopenia or osteoporosis. Past Medical History:   Diagnosis Date    Breast cancer ([facility] [address]     dcis    Cancer [facility])     left breast cancer    Radiation therapy complication      Past Surgical History:   Procedure Laterality Date    HX BREAST LUMPECTOMY Left     HX GYN          NC MASTECTOMY, PARTIAL         Current Outpatient Medications   Medication Sig Dispense Refill    zolpidem (AMBIEN) 10 mg tablet Take  by mouth nightly as needed for Sleep.  naproxen sodium 220 mg cap Take  by mouth.  docusate sodium (COLACE) 100 mg capsule Take 100 mg by mouth two (2) times a day.  loratadine 10 mg cap Take  by mouth.  valACYclovir (VALTREX) 500 mg tablet Take  by mouth two (2) times a day.       rizatriptan (MAXALT) 10 mg tablet Take 10 mg by mouth once as needed for Migraine. May repeat in 2 hours if needed      cholecalciferol, vitamin D3, (VITAMIN D3) 2,000 unit tab Take 2,000 Units by mouth daily. Allergies: Penicillin g     Tobacco History:  reports that she has never smoked. She has never used smokeless tobacco.  Alcohol Abuse:  reports that she drinks alcohol. Drug Abuse:  has no drug history on file.     Family Medical/Cancer History:   Family History   Problem Relation Age of Onset   Govern[employer] Cancer Father         Lung    Cancer Other         father history of colon polyps    Breast Cancer Other     Breast Cancer Maternal Aunt         Review of Systems - History obtained from the patient  Constitutional: negative for weight loss, fever, night sweats  HEENT: negative for hearing loss, earache, congestion, snoring, sorethroat  CV: negative for chest pain, palpitations, edema  Resp: negative for cough, shortness of breath, wheezing  GI: negative for change in bowel habits, abdominal pain, black or bloody stools  : negative for frequency, dysuria, hematuria, vaginal discharge  MSK: negative for back pain, joint pain, muscle pain  Breast: negative for breast lumps, nipple discharge, galactorrhea  Skin :negative for itching, rash, hives  Neuro: negative for dizziness, headache, confusion, weakness  Psych: negative for anxiety, depression, change in mood  Heme/lymph: negative for bleeding, bruising, pallor    Physical Exam    Visit Vitals  Ht 5' 5\" (1.651 m)   BMI 21.60 kg/m²       Constitutional  · Appearance: well-nourished, well developed, alert, in no acute distress    HENT  · Head and Face: appears normal    Chest  · Respiratory Effort: breathing unlabored      Breasts  · Inspection of Breasts: breasts symmetrical, no skin changes, no discharge present, nipple appearance normal, no skin retraction present  · Palpation of Breasts and Axillae: no masses present on palpation, no breast tenderness  · Axillary Lymph Nodes: no lymphadenopathy present    Gastrointestinal  · Abdominal Examination: abdomen non-tender to palpation, normal bowel sounds, no masses present  · Liver and spleen: no hepatomegaly present, spleen not palpable  · Hernias: no hernias identified    Skin  · General Inspection: no rash, no lesions identified    Neurologic/Psychiatric  · Mental Status:  · Orientation: grossly oriented to person, place and time  · Mood and Affect: mood normal, affect appropriate    Genitourinary  · External Genitalia: normal appearance for age, no discharge present, no tenderness present, no inflammatory lesions present, no masses present, no atrophy present  · Vagina: normal vaginal vault without central or paravaginal defects, no discharge present, no inflammatory lesions present, no masses present  · Bladder: non-tender to palpation  · Urethra: appears normal  · Cervix: normal   · Uterus: normal size, shape and consistency  · Adnexa: no adnexal tenderness present, no adnexal masses present  · Perineum: perineum within normal limits, no evidence of trauma, no rashes or skin lesions present  · Anus: anus within normal limits, no hemorrhoids present  · Inguinal Lymph Nodes: no lymphadenopathy present    Assessment:  Routine gynecologic examination  Her current medical status is satisfactory with no evidence of significant gynecologic issues.     Plan:  Perimenopause reviewed  Pap done today  Counseled re: diet, exercise, healthy lifestyle  Return for yearly wellness visits  Rec annual mammogram

## 2019-10-08 LAB
CYTOLOGIST CVX/VAG CYTO: NORMAL
CYTOLOGY CVX/VAG DOC CYTO: NORMAL
CYTOLOGY CVX/VAG DOC THIN PREP: NORMAL
DX ICD CODE: NORMAL
LABCORP, 190119: NORMAL
Lab: NORMAL
OTHER STN SPEC: NORMAL
STAT OF ADQ CVX/VAG CYTO-IMP: NORMAL

## 2019-11-01 ENCOUNTER — HOSPITAL ENCOUNTER (OUTPATIENT)
Dept: MAMMOGRAPHY | Age: 54
Discharge: HOME | End: 2019-11-01
Attending: FAMILY MEDICINE
Payer: COMMERCIAL

## 2019-11-01 DIAGNOSIS — Z12.39 SCREENING BREAST EXAMINATION: ICD-10-CM

## 2019-11-01 PROCEDURE — 77063 BREAST TOMOSYNTHESIS BI: CPT

## 2020-06-15 ENCOUNTER — PATIENT MESSAGE (OUTPATIENT)
Dept: OBGYN CLINIC | Age: 55
End: 2020-06-15

## 2020-06-15 DIAGNOSIS — F51.01 PRIMARY INSOMNIA: Primary | ICD-10-CM

## 2020-06-16 RX ORDER — GABAPENTIN 300 MG/1
300 CAPSULE ORAL
Qty: 30 CAP | Refills: 6 | Status: SHIPPED | OUTPATIENT
Start: 2020-06-16 | End: 2021-01-06 | Stop reason: ALTCHOICE

## 2020-06-16 RX ORDER — GABAPENTIN 100 MG/1
100 CAPSULE ORAL
Qty: 7 CAP | Refills: 0 | Status: SHIPPED | OUTPATIENT
Start: 2020-06-16 | End: 2021-01-06 | Stop reason: ALTCHOICE

## 2020-08-12 ENCOUNTER — EMPLOYEE WELLNESS (OUTPATIENT)
Dept: OTHER | Facility: CLINIC | Age: 55
End: 2020-08-12

## 2020-08-12 LAB
CHOLEST SERPL-MCNC: 225 MG/DL
GLUCOSE SERPL-MCNC: 84 MG/DL (ref 65–100)
HDLC SERPL-MCNC: 96 MG/DL
LDLC SERPL CALC-MCNC: 116.4 MG/DL (ref 0–100)
TRIGL SERPL-MCNC: 63 MG/DL (ref ?–150)

## 2020-10-07 ENCOUNTER — TRANSCRIBE ORDER (OUTPATIENT)
Dept: SCHEDULING | Age: 55
End: 2020-10-07

## 2020-10-07 DIAGNOSIS — Z12.31 VISIT FOR SCREENING MAMMOGRAM: Primary | ICD-10-CM

## 2020-11-02 ENCOUNTER — HOSPITAL ENCOUNTER (OUTPATIENT)
Dept: PHYSICAL THERAPY | Age: 55
Discharge: HOME | End: 2020-11-02
Payer: COMMERCIAL

## 2020-11-02 PROCEDURE — 97161 PT EVAL LOW COMPLEX 20 MIN: CPT | Performed by: PHYSICAL THERAPIST

## 2020-11-02 PROCEDURE — 20561 NDL INSJ W/O NJX 3+ MUSC: CPT | Performed by: PHYSICAL THERAPIST

## 2020-11-02 PROCEDURE — 97140 MANUAL THERAPY 1/> REGIONS: CPT | Performed by: PHYSICAL THERAPIST

## 2020-11-02 NOTE — PROGRESS NOTES
[facility] Physical Therapy  [address]  [place], [address]  Phone: [phone]  Fax: [phone]    Plan of Care/Statement of Necessity for Physical Therapy Services  2-15    Patient name: Danni Kilpatrick  : 1965  Provider#: [id number]  Referral source: Alma Delia Guerra MD      Medical/Treatment Diagnosis: Low back pain [M54.5]     Prior Hospitalization: see medical history     Comorbidities: see evaluation  Prior Level of Function:see evaluation  Medications: Verified on Patient Summary List  Start of Care: 2020     Onset Date:see evaluation   The Plan of Care and following information is based on the information from the initial evaluation. Assessment/ key information: Patient presents with signs and symptoms consistent with chronic LBP and will benefit from physical therapy to address deficits noted below in problem list.   Evaluation Complexity History LOW Complexity : Zero comorbidities / personal factors that will impact the outcome / POC; Examination LOW Complexity : 1-2 Standardized tests and measures addressing body structure, function, activity limitation and / or participation in recreation  ;Presentation LOW Complexity : Stable, uncomplicated  ;Clinical Decision Making LOW Complexity : FOTO score of   Overall Complexity Rating: LOW   Problem List: pain affecting function, decrease ROM, decrease strength, decrease ADL/ functional abilitiies, decrease activity tolerance, decrease flexibility/ joint mobility and decrease transfer abilities   Treatment Plan may include any combination of the following: Therapeutic exercise, Therapeutic activities, Neuromuscular re-education, Physical agent/modality, Manual therapy, Patient education and Self Care training, dry needling.   Patient / Family readiness to learn indicated by: asking questions, trying to perform skills and interest  Persons(s) to be included in education: patient (P)  Barriers to Learning/Limitations: None  Patient Goal (s): please see evaluation in Connect Care  Patient Self Reported Health Status: please see paper chart  Rehabilitation Potential: good    Short Term Goals: To be accomplished in 5 treatments:  -Independent in HEP as evidenced on ability to perform at least 5 exercises from HEP using proper form without verbal cuing.   -Pain less than or equal to 8/10 at worst to allow patient to perform ADL's with greater ease  -Demostrate proper posture in order to decrease lumbar pain  -Pt will report compliance with icing 1-2x/day in order to decrease inflammation    Long Term Goals: To be accomplished in 3 months:   -Pt will be able to walk at least 3 miles to allow pt to have family time   -Pt will be able to sit for at least two hours without back pain to allow pt to   -Pt will report leg pain is improved by 50% to allow pt to exercise with greater ease. Frequency / Duration: Patient to be seen 1-2 times per week for 3 months. Patient/ Caregiver education and instruction: self care, activity modification and exercises    [x]  Plan of care has been reviewed with PTA    Certification Period: 11/2/2020 -  2/2/21    Cedric Mason. Vanessa, PT, DPT, CMTPT      63/7/6701 8:26 PM  PT License Number: [id number]  _____________________________________________________________________    I certify that the above Therapy Services are being furnished while the patient is under my care. I agree with the treatment plan and certify that this therapy is necessary.     [de-identified] Signature:____________________  Date:____________Time:_________

## 2020-11-02 NOTE — PROGRESS NOTES
PT INITIAL EVALUATION NOTE - [facility] 2-15    Patient Name: Mark Tan  Date:2020  : 1965  [x]  Patient  Verified  Payor: MEDICAL [facility] / Plan: [facility] MEDICAL [place] [address] / Product Type: Commerical /    In time:100 P  Out time:210 P  Total Treatment Time (min): 70 (40 eval, 20 timed, 10 modality see below)  Total Timed Codes (min): 20   Visit #:1    Treatment Area: Low back pain [M54.5]    SUBJECTIVE  Any medication changes, allergies to medications, adverse drug reactions, diagnosis change, or new procedure performed?: [] No    [x] Yes (see summary sheet for update)  Date of onset/injury: Pt with longstanding low back pain for a few decades  Has been managing with injections from Dr. Duane Ramming for the past three years  The last two injections did not help much so she wants to try DN services  Pain:   10/10 max 1/10 min 4-5/10 now     Location of symptoms: destiny low back, wraps around right post to ant aspect of right hip and radiates down lat thigh to above knee. Description of symptoms: sharp/nagging, tight in low back, burning in leg  Aggravated by: sitting>15 min, walking >15 min  (both bother low back)  Exercising at gym (bothers hip)  Eased by: standing  Prior tests/injections:[facility] at [facility]. PMH:  WNL  Any weakness in LE's: none  Any tingling/numbness in LE's: none  Recent weight/loss or weight gain: none  Prior tx: PT -please see prior notes in connect care for details. 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 \"    OBJECTIVE    Observation: left lumbar shift  Significant scoliotic curve noted-rib hump noted on left  Lordosis   [] Inc    [x] Dec  Pelvic symmetry  [x] WNL  [] Other:    Gait: WNL    AROM   % decreased   Flexion     Extension     Right Sidebending 20% p! Lat hip on right.    Left Sidebending    Right Rotation    Left Rotation      Strength  *5/5 unless noted below    L(0-5) R (0-5)   Hip Flexion (L1,2)     Knee Extension (L3,4)     Knee Flexion (S1,2)     Ankle Dorsiflexion (L4)     Great Toe Extension (L5)     Ankle Plantarflexion (S1)     Ankle Eversion (S1)     Lower Abdominals 3 3   Paraspinals     Hip Extensors 3+ 3+   Hip Abductors 4- 4-   Hip ER's     Hip IR's       Tenderness to palpation: right glute med, glute max, piriformis, lumbar parapsinals    Special Tests:   -SLR  All special tests for right hip neg for OA/labral pathology of (R) hip  Lumbar distraction-no change in symptoms. Flexibility: dec piriformis destiny right >left    Joint mobility:  Hypomobility L1-L5, pain with testing all segments. Hypomobility noted destiny sacral base. No pain with testing. OBJECTIVE  [x] Skin assessment post-treatment:  [x]intact []redness- no adverse reaction    []redness - adverse reaction:       10 min Manual Therapy:STM to areas below post needling. Rationale: decrease pain, increase ROM, increase tissue extensibility and decrease trigger points to improve the patients ability to sit. 1 3+ DN : .30 x 50 mm needles used to right glute med, glute min, piriformis, glute max. (10 min total-untimed)  Hemostasis applied after each needle inserted. 10 min [x]  Ice     []  Heat  Position:supine, destiny LE's supported  Location: low back/buttocks   Rationale: decrease edema, decrease inflammation, decrease pain and reduce soreness post therapy in order to improve the patients ability to perform ADL's.             With   [] TE   [x] manual   [] self care    Patient Education: [x] Review HEP    [] Progressed/Changed HEP based on:   [] positioning   [] body mechanics   [] transfers   [x] Ice application- pt advised to ice 10-15 min 1-2 x/day to area in order to dec inflammation  [x] other:  re: mechanism of injury/condition, role of physical therapy, prognosis for recovery, heat vs ice, activity modifications. Education re: proper log roll technique in order to prevent excessive stresses through spine. 1. Script obtained from MD specifying \"dry needling. \"  2. Patient educated on risks and benefits of DN and signed consent. Discussed mechanism of DN, effects of DN, trigger points, the concept of referred pain, and what to expect post- treatment session. Pain Level (0-10 scale) post treatment: 4 (sore)    ASSESSMENT/Changes in Function:     [x]  See Plan of Alex.  CHELSY Mendez, JUANAT, CMTPT  PT License Number: [id number]   11/2/2020  1:01 PM

## 2020-11-10 ENCOUNTER — HOSPITAL ENCOUNTER (OUTPATIENT)
Dept: MAMMOGRAPHY | Age: 55
Discharge: HOME | End: 2020-11-10
Attending: FAMILY MEDICINE
Payer: COMMERCIAL

## 2020-11-10 DIAGNOSIS — Z12.31 VISIT FOR SCREENING MAMMOGRAM: ICD-10-CM

## 2020-11-10 PROCEDURE — 77063 BREAST TOMOSYNTHESIS BI: CPT

## 2020-11-12 ENCOUNTER — HOSPITAL ENCOUNTER (OUTPATIENT)
Dept: PHYSICAL THERAPY | Age: 55
Discharge: HOME | End: 2020-11-12
Payer: COMMERCIAL

## 2020-11-12 PROCEDURE — 97140 MANUAL THERAPY 1/> REGIONS: CPT | Performed by: PHYSICAL THERAPIST

## 2020-11-12 PROCEDURE — 20561 NDL INSJ W/O NJX 3+ MUSC: CPT | Performed by: PHYSICAL THERAPIST

## 2020-11-12 NOTE — PROGRESS NOTES
PHYSICAL THERAPY -DRY NEEDLING  DAILY TREATMENT NOTE    Patient Name: Amos Hager  Date:2020  : 1965  [x]  Patient  Verified  Payor: MEDICAL [facility] / [facility] [place] [facility] / Product Type: Commerical /    In time:600 P  Out time:640 P   Total Treatment Time (min): 40  Total Timed Codes (min): 30  Visit #:  2    Treatment Area: Low back pain [M54.5]    SUBJECTIVE  Pain Level (on 0 to 10 scale):  3  / 10   Medication Changes/New allergies or changes in medical history, any new surgeries or procedures? NO    If yes, update Summary List   Subjective Functional Status/Changes:  []  No changes reported   Pt reports she was able to walk a 3 mile loop without leg pain which she has not been able to do in the past.  She can sit longer without the pain coming on. OBJECTIVE  Modality:  10 min of ice used post-tx to decrease irritation, pain, and prevent soreness    *Skin assessment post-treatment:    [x]  intact    [x]  redness- no adverse reaction     []redness  adverse reaction:          15 min  (untimed) []15461: 1-2 muscles  [x]: 3+ muscles   DN as noted in separate note below   Rationale:      decrease pain, increase ROM, increase tissue extensibility and decrease trigger points to improve patient's ability to perform ADL's. 15 min Manual Therapy:  [x]  See flow sheet: Soft tissue mobilization to above areas    Rationale:      increase ROM, increase strength and improve functional mobility to improve the patients ability to perform ADL's    With Manual  Patient Education:  YES  Reviewed HEP     Other Objective Measures:                Dry Needling Procedure Note    Procedure: An intramuscular manual therapy (dry needling) and a neuro-muscular re-education treatment was done to deactivate myofascial trigger points, with a solid filament needle, under aseptic technique.     Indication(s): [] Muscle spasms [] Headaches  [] TMJD      [] Muscle imbalances [x] Myofascial pain & dysfunction     [] Decreased ROM    TIMEOUT PERFORMED:    600 (time the timeout was completed)   Irma Christie (who was present)    Informed Consent Obtained: [x] Verbal    The following items were reviewed with the patient:  -Purpose of dry needling, side effects, possible complications, and the informed consent   -The need to report the use of blood thinners and/or immunosuppressant medications  -How to respond to possible adverse effects of the treatment  -Self treatment of post needling soreness: ice/heat, stretching, and activity modification.   -Opportunity was given to ask any questions, all questions were answered    Treatment:  The following muscles were treated today:  Prone 1 pillow under hips. Tyler:  Right: .30 x 60 mm needles used to glute med, glute min, glute max, piriformis. Left:  *hemostasis applied after each needle was applied. Patients response:   [x]  LTRs  []  Muscle Relaxation  [x]  Pain Relief   []  Decreased HAs [x]  Post-needling soreness []  Increased ROM      Post Treatment Pain Level (on 0 to 10) scale:   2  / 10   ASSESSMENT       [x]  See Progress Note/Recertification   Patient will continue to benefit from skilled PT services to modify and progress therapeutic interventions, address functional mobility deficits, address ROM deficits, address strength deficits, analyze and address soft tissue restrictions, analyze and modify body mechanics/ergonomics and instruct in home and community integration to attain remaining goals. Progress toward goals / Updated goals:       PLAN  []  Upgrade activities as tolerated YES Continue plan of care   []  Discharge due to :    []  Other:      Murleen Knife.  Vanessa PT, DPT, CMTPT  PT License Number: [id number]    11/12/2020

## 2020-11-17 ENCOUNTER — HOSPITAL ENCOUNTER (OUTPATIENT)
Dept: PHYSICAL THERAPY | Age: 55
Discharge: HOME | End: 2020-11-17
Payer: COMMERCIAL

## 2020-11-17 PROCEDURE — 97140 MANUAL THERAPY 1/> REGIONS: CPT | Performed by: PHYSICAL THERAPIST

## 2020-11-17 PROCEDURE — 20560 NDL INSJ W/O NJX 1 OR 2 MUSC: CPT | Performed by: PHYSICAL THERAPIST

## 2020-11-17 NOTE — PROGRESS NOTES
PHYSICAL THERAPY -DRY NEEDLING  DAILY TREATMENT NOTE    Patient Name: Mark Tan  Date:2020  : 1965  [x]  Patient  Verified  Payor: [employer] [place] / Plan: [facility] MEDICAL [place] [address] / Product Type: Commerical /    In time:115 P  Out time: 200 P   Total Treatment Time (min):45  Total Timed Codes (min): 35  Visit #:  3    Treatment Area: Low back pain [M54.5]    SUBJECTIVE  Pain Level (on 0 to 10 scale):  2 / 10   Medication Changes/New allergies or changes in medical history, any new surgeries or procedures? NO    If yes, update Summary List   Subjective Functional Status/Changes:  []  No changes reported   Walking still feeling better. Was a little more sore after last time, would like to do a bit less today. OBJECTIVE  Modality:  10 min of ice used post-tx to decrease irritation, pain, and prevent soreness    *Skin assessment post-treatment:    [x]  intact    [x]  redness- no adverse reaction     []redness  adverse reaction:          15 min  (untimed) [x]: 1-2 muscles  []: 3+ muscles   DN as noted in separate note below   Rationale:      decrease pain, increase ROM, increase tissue extensibility and decrease trigger points to improve patient's ability to perform ADL's. 20 min Manual Therapy:  [x]  See flow sheet: Soft tissue mobilization to above areas    Rationale:      increase ROM, increase strength and improve functional mobility to improve the patients ability to perform ADL's    With Manual  Patient Education:  YES  Reviewed HEP     Other Objective Measures:                Dry Needling Procedure Note    Procedure: An intramuscular manual therapy (dry needling) and a neuro-muscular re-education treatment was done to deactivate myofascial trigger points, with a solid filament needle, under aseptic technique.     Indication(s): [] Muscle spasms [] Headaches  [] TMJD      [] Muscle imbalances [x] Myofascial pain & dysfunction     [] Decreased ROM    TIMEOUT PERFORMED:    115 P  (time the timeout was completed)   Reena Mckeon (who was present)    Informed Consent Obtained: [x] Verbal    The following items were reviewed with the patient:  -Purpose of dry needling, side effects, possible complications, and the informed consent   -The need to report the use of blood thinners and/or immunosuppressant medications  -How to respond to possible adverse effects of the treatment  -Self treatment of post needling soreness: ice/heat, stretching, and activity modification.   -Opportunity was given to ask any questions, all questions were answered    Treatment:  The following muscles were treated today:  Prone 1 pillow under hips. Tyler: lumbar multifidi L4 and L5  Right: .30 x 60 mm needles used to glute med, glute min  Left:  *hemostasis applied after each needle was applied. Patients response:   [x]  LTRs  []  Muscle Relaxation  [x]  Pain Relief   []  Decreased HAs [x]  Post-needling soreness []  Increased ROM      Post Treatment Pain Level (on 0 to 10) scale:   1 / 10   ASSESSMENT       [x]  See Progress Note/Recertification   Patient will continue to benefit from skilled PT services to modify and progress therapeutic interventions, address functional mobility deficits, address ROM deficits, address strength deficits, analyze and address soft tissue restrictions, analyze and modify body mechanics/ergonomics and instruct in home and community integration to attain remaining goals. Progress toward goals / Updated goals:       PLAN  []  Upgrade activities as tolerated YES Continue plan of care   []  Discharge due to :    []  Other:      Demetri Chopra.  Vanessa PT, DPT, CMTPT  PT License Number: [id number]    11/17/2020

## 2020-11-23 ENCOUNTER — HOSPITAL ENCOUNTER (OUTPATIENT)
Dept: PHYSICAL THERAPY | Age: 55
Discharge: HOME | End: 2020-11-23
Payer: COMMERCIAL

## 2020-11-23 PROCEDURE — 97140 MANUAL THERAPY 1/> REGIONS: CPT | Performed by: PHYSICAL THERAPIST

## 2020-11-23 PROCEDURE — 20560 NDL INSJ W/O NJX 1 OR 2 MUSC: CPT | Performed by: PHYSICAL THERAPIST

## 2020-11-23 NOTE — PROGRESS NOTES
PHYSICAL THERAPY -DRY NEEDLING  DAILY TREATMENT NOTE    Patient Name: Dee Garcia  Date:2020  : 1965  [x]  Patient  Verified  Payor: [employer] [place] / Plan: [facility] MEDICAL [place] [address] / Product Type: Commerical /    In time:200 P  Out time: 250 P   Total Treatment Time (min):50  Total Timed Codes (min): 40  Visit #:  4    Treatment Area: Low back pain [M54.5]    SUBJECTIVE  Pain Level (on 0 to 10 scale):  2 / 10   Medication Changes/New allergies or changes in medical history, any new surgeries or procedures? NO    If yes, update Summary List   Subjective Functional Status/Changes:  []  No changes reported   Much better.  notices she's able to do more around the house with less pain. OBJECTIVE  Modality:  10 min of ice used post-tx to decrease irritation, pain, and prevent soreness    *Skin assessment post-treatment:    [x]  intact    [x]  redness- no adverse reaction     []redness  adverse reaction:          15 min  (untimed) [x]91839: 1-2 muscles  []: 3+ muscles   DN as noted in separate note below   Rationale:      decrease pain, increase ROM, increase tissue extensibility and decrease trigger points to improve patient's ability to perform ADL's. 20 min Manual Therapy:  [x]  See flow sheet: Soft tissue mobilization to above areas    Rationale:      increase ROM, increase strength and improve functional mobility to improve the patients ability to perform ADL's    With Manual  Patient Education:  YES  Reviewed HEP     Other Objective Measures:                Dry Needling Procedure Note    Procedure: An intramuscular manual therapy (dry needling) and a neuro-muscular re-education treatment was done to deactivate myofascial trigger points, with a solid filament needle, under aseptic technique.     Indication(s): [] Muscle spasms [] Headaches  [] TMJD      [] Muscle imbalances [x] Myofascial pain & dysfunction     [] Decreased ROM    TIMEOUT PERFORMED:    200  (time the timeout was completed)   David Lozano (who was present)    Informed Consent Obtained: [x] Verbal    The following items were reviewed with the patient:  -Purpose of dry needling, side effects, possible complications, and the informed consent   -The need to report the use of blood thinners and/or immunosuppressant medications  -How to respond to possible adverse effects of the treatment  -Self treatment of post needling soreness: ice/heat, stretching, and activity modification.   -Opportunity was given to ask any questions, all questions were answered    Treatment:  The following muscles were treated today:  Prone 1 pillow under hips. Tyler: lumbar multifidi L4 and L5  Right: .30 x 60 mm needles used to glute med, glute min  Left:  *hemostasis applied after each needle was applied. Patients response:   [x]  LTRs  []  Muscle Relaxation  [x]  Pain Relief   []  Decreased HAs [x]  Post-needling soreness []  Increased ROM      Post Treatment Pain Level (on 0 to 10) scale:   1 / 10   ASSESSMENT       [x]  See Progress Note/Recertification   Patient will continue to benefit from skilled PT services to modify and progress therapeutic interventions, address functional mobility deficits, address ROM deficits, address strength deficits, analyze and address soft tissue restrictions, analyze and modify body mechanics/ergonomics and instruct in home and community integration to attain remaining goals. Progress toward goals / Updated goals:       PLAN  []  Upgrade activities as tolerated YES Continue plan of care   []  Discharge due to :    []  Other:      Lou Katz.  Vanessa PT, DPT, CMTPT  PT License Number: [id number]    11/23/2020

## 2020-11-30 ENCOUNTER — HOSPITAL ENCOUNTER (OUTPATIENT)
Dept: PHYSICAL THERAPY | Age: 55
Discharge: HOME | End: 2020-11-30
Payer: COMMERCIAL

## 2020-11-30 PROCEDURE — 20560 NDL INSJ W/O NJX 1 OR 2 MUSC: CPT | Performed by: PHYSICAL THERAPIST

## 2020-11-30 PROCEDURE — 97140 MANUAL THERAPY 1/> REGIONS: CPT | Performed by: PHYSICAL THERAPIST

## 2020-11-30 NOTE — PROGRESS NOTES
PHYSICAL THERAPY -DRY NEEDLING  DAILY TREATMENT NOTE    Patient Name: Rea Germain  Date:2020  : 1965  [x]  Patient  Verified  Payor: MEDICAL [facility] / Plan: [facility] MEDICAL [place] [address] / Product Type: Commerical /    In time:215 P   Out time: 300 P    Total Treatment Time (min)45  Total Timed Codes (min): 35  Visit #:  5    Treatment Area: Low back pain [M54.5]    SUBJECTIVE  Pain Level (on 0 to 10 scale): 1 / 10   Medication Changes/New allergies or changes in medical history, any new surgeries or procedures? NO    If yes, update Summary List   Subjective Functional Status/Changes:  []  No changes reported   Pt was able to hike this weekend without a lot of pain. OBJECTIVE  Modality:  10 min of ice used post-tx to decrease irritation, pain, and prevent soreness    *Skin assessment post-treatment:    [x]  intact    [x]  redness- no adverse reaction     []redness  adverse reaction:          15 min  (untimed) [x][id number]: 1-2 muscles  []: 3+ muscles   DN as noted in separate note below   Rationale:      decrease pain, increase ROM, increase tissue extensibility and decrease trigger points to improve patient's ability to perform ADL's. 20 min Manual Therapy:  [x]  See flow sheet: Soft tissue mobilization to above areas    Rationale:      increase ROM, increase strength and improve functional mobility to improve the patients ability to perform ADL's    With Manual  Patient Education:  YES  Reviewed HEPpt with questions re: return to gym-stairs and weight lifting. Advised pt re-initiate HEP first.  Offered to review HEP but pt wishes to do so next time. Will work on what she remembers at home for the next time. Other Objective Measures:                Dry Needling Procedure Note    Procedure:    An intramuscular manual therapy (dry needling) and a neuro-muscular re-education treatment was done to deactivate myofascial trigger points, with a solid filament needle, under aseptic technique. Indication(s): [] Muscle spasms [] Headaches  [] TMJD      [] Muscle imbalances [x] Myofascial pain & dysfunction     [] Decreased ROM    TIMEOUT PERFORMED:    215  (time the timeout was completed)   Didier Yanez (who was present)    Informed Consent Obtained: [x] Verbal    The following items were reviewed with the patient:  -Purpose of dry needling, side effects, possible complications, and the informed consent   -The need to report the use of blood thinners and/or immunosuppressant medications  -How to respond to possible adverse effects of the treatment  -Self treatment of post needling soreness: ice/heat, stretching, and activity modification.   -Opportunity was given to ask any questions, all questions were answered    Treatment:  The following muscles were treated today:  Prone 1 pillow under hips. Tyler: lumbar multifidi L4 and L5  Right: .30 x 60 mm needles used to glute med, glute min, piriformis, glute max  + QL in sidelying  Left:  *hemostasis applied after each needle was applied. Patients response:   [x]  LTRs  []  Muscle Relaxation  [x]  Pain Relief   []  Decreased HAs [x]  Post-needling soreness []  Increased ROM      Post Treatment Pain Level (on 0 to 10) scale:  0 / 10   ASSESSMENT       [x]  See Progress Note/Recertification   Patient will continue to benefit from skilled PT services to modify and progress therapeutic interventions, address functional mobility deficits, address ROM deficits, address strength deficits, analyze and address soft tissue restrictions, analyze and modify body mechanics/ergonomics and instruct in home and community integration to attain remaining goals. Progress toward goals / Updated goals:       PLAN  []  Upgrade activities as tolerated YES Continue plan of care   []  Discharge due to :    []  Other:      Belen Hannon.  Vanessa, PT, DPT, CMTPT  PT License Number: [id number]    11/30/2020

## 2020-12-08 ENCOUNTER — HOSPITAL ENCOUNTER (OUTPATIENT)
Dept: PHYSICAL THERAPY | Age: 55
Discharge: HOME | End: 2020-12-08
Payer: COMMERCIAL

## 2020-12-08 PROCEDURE — 97140 MANUAL THERAPY 1/> REGIONS: CPT | Performed by: PHYSICAL THERAPIST

## 2020-12-08 PROCEDURE — 20561 NDL INSJ W/O NJX 3+ MUSC: CPT | Performed by: PHYSICAL THERAPIST

## 2020-12-09 NOTE — PROGRESS NOTES
PHYSICAL THERAPY -DRY NEEDLING  DAILY TREATMENT NOTE    Patient Name: Elayne Bui  Date:2020  : 1965  [x]  Patient  Verified  Payor: [address] / Plan: [facility] [employer]ICAL [employer] [address] / Product Type: Commerical /    In time:605 P    Out time:705 P   Total Treatment Time (min)60  Total Timed Codes (min): 50  Visit #:  6    Treatment Area: Low back pain [M54.5]    SUBJECTIVE  Pain Level (on 0 to 10 scale): 1 / 10   Medication Changes/New allergies or changes in medical history, any new surgeries or procedures? NO    If yes, update Summary List   Subjective Functional Status/Changes:  []  No changes reported   Less pain for sure. Helping. Has been doing old HEP at home and it has been helping. OBJECTIVE  Modality:  10 min of ice used post-tx to decrease irritation, pain, and prevent soreness    *Skin assessment post-treatment:    [x]  intact    [x]  redness- no adverse reaction     []redness  adverse reaction:          25 min  (untimed) []: 1-2 muscles  [x]: 3+ muscles   DN as noted in separate note below   Rationale:      decrease pain, increase ROM, increase tissue extensibility and decrease trigger points to improve patient's ability to perform ADL's.    25 min Manual Therapy:  [x]  See flow sheet: Soft tissue mobilization to above areas    Rationale:      increase ROM, increase strength and improve functional mobility to improve the patients ability to perform ADL's    With Manual  Patient Education:  YES  Reviewed HEP       Other Objective Measures:                Dry Needling Procedure Note    Procedure: An intramuscular manual therapy (dry needling) and a neuro-muscular re-education treatment was done to deactivate myofascial trigger points, with a solid filament needle, under aseptic technique.     Indication(s): [] Muscle spasms [] Headaches  [] TMJD      [] Muscle imbalances [x] Myofascial pain & dysfunction     [] Decreased ROM    TIMEOUT PERFORMED:    605  (time the timeout was completed)   Raza Ryan (who was present)    Informed Consent Obtained: [x] Verbal    The following items were reviewed with the patient:  -Purpose of dry needling, side effects, possible complications, and the informed consent   -The need to report the use of blood thinners and/or immunosuppressant medications  -How to respond to possible adverse effects of the treatment  -Self treatment of post needling soreness: ice/heat, stretching, and activity modification.   -Opportunity was given to ask any questions, all questions were answered    Treatment:  The following muscles were treated today:  Prone 1 pillow under hips. Tyler: lumbar multifidi L4 and L5, + QL in sidelying  Right: .30 x 60 mm needles used to glute med, glute min, piriformis, glute max  Left:  *hemostasis applied after each needle was applied. Patients response:   [x]  LTRs  []  Muscle Relaxation  [x]  Pain Relief   []  Decreased HAs [x]  Post-needling soreness []  Increased ROM      Post Treatment Pain Level (on 0 to 10) scale:  0 / 10   ASSESSMENT       [x]  See Progress Note/Recertification   Patient will continue to benefit from skilled PT services to modify and progress therapeutic interventions, address functional mobility deficits, address ROM deficits, address strength deficits, analyze and address soft tissue restrictions, analyze and modify body mechanics/ergonomics and instruct in home and community integration to attain remaining goals. Progress toward goals / Updated goals:       PLAN  []  Upgrade activities as tolerated YES Continue plan of care   []  Discharge due to :    []  Other:      Niles Jloley.  Vanessa PT, DPT, CMTPT  PT License Number: [id number]    12/8/2020

## 2020-12-15 ENCOUNTER — HOSPITAL ENCOUNTER (OUTPATIENT)
Dept: PHYSICAL THERAPY | Age: 55
Discharge: HOME | End: 2020-12-15
Payer: COMMERCIAL

## 2020-12-15 PROCEDURE — 97140 MANUAL THERAPY 1/> REGIONS: CPT | Performed by: PHYSICAL THERAPIST

## 2020-12-15 PROCEDURE — 20561 NDL INSJ W/O NJX 3+ MUSC: CPT | Performed by: PHYSICAL THERAPIST

## 2020-12-15 NOTE — PROGRESS NOTES
PHYSICAL THERAPY -DRY NEEDLING  DAILY TREATMENT NOTE    Patient Name: Pollo Kelley  Date:12/15/2020  : 1965  [x]  Patient  Verified  Payor: MEDICAL [facility] / Plan: [facility] MEDICAL [place] [address] / Product Type: Commerical /    In time:610     Out time:700 p   Total Treatment Time (min) 50  Total Timed Codes (min):40  Visit #:  7    Treatment Area: Low back pain [M54.5]    SUBJECTIVE  Pain Level (on 0 to 10 scale): 3 / 10   Medication Changes/New allergies or changes in medical history, any new surgeries or procedures? NO    If yes, update Summary List   Subjective Functional Status/Changes:  []  No changes reported   Very sore after last time. OBJECTIVE  Modality:  10 min of ice used post-tx to decrease irritation, pain, and prevent soreness    *Skin assessment post-treatment:    [x]  intact    [x]  redness- no adverse reaction     []redness  adverse reaction:          15 min  (untimed) []: 1-2 muscles  [x]: 3+ muscles   DN as noted in separate note below   Rationale:      decrease pain, increase ROM, increase tissue extensibility and decrease trigger points to improve patient's ability to perform ADL's.    25 min Manual Therapy:  [x]  See flow sheet: Soft tissue mobilization to above areas    Rationale:      increase ROM, increase strength and improve functional mobility to improve the patients ability to perform ADL's    With Manual  Patient Education:  YES  Reviewed HEP       Other Objective Measures:                Dry Needling Procedure Note    Procedure: An intramuscular manual therapy (dry needling) and a neuro-muscular re-education treatment was done to deactivate myofascial trigger points, with a solid filament needle, under aseptic technique.     Indication(s): [] Muscle spasms [] Headaches  [] TMJD      [] Muscle imbalances [x] Myofascial pain & dysfunction     [] Decreased ROM    TIMEOUT PERFORMED:    610  (time the timeout was completed)   Tye Miranda Iban (who was present)    Informed Consent Obtained: [x] Verbal    The following items were reviewed with the patient:  -Purpose of dry needling, side effects, possible complications, and the informed consent   -The need to report the use of blood thinners and/or immunosuppressant medications  -How to respond to possible adverse effects of the treatment  -Self treatment of post needling soreness: ice/heat, stretching, and activity modification.   -Opportunity was given to ask any questions, all questions were answered    Treatment:  The following muscles were treated today:  Prone 1 pillow under hips. Tyler: lumbar multifidi L4 and L5  Right: .30 x 60 mm needles used to glute med, glute min, piriformis, glute max  Left:  *hemostasis applied after each needle was applied. Patients response:   [x]  LTRs  []  Muscle Relaxation  [x]  Pain Relief   []  Decreased HAs [x]  Post-needling soreness []  Increased ROM      Post Treatment Pain Level (on 0 to 10) scale:  0 / 10   ASSESSMENT       [x]  See Progress Note/Recertification   Patient will continue to benefit from skilled PT services to modify and progress therapeutic interventions, address functional mobility deficits, address ROM deficits, address strength deficits, analyze and address soft tissue restrictions, analyze and modify body mechanics/ergonomics and instruct in home and community integration to attain remaining goals. Progress toward goals / Updated goals:       PLAN  []  Upgrade activities as tolerated YES Continue plan of care   []  Discharge due to :    []  Other:      Cheko Johnson.  Vanessa PT, DPT, CMTPT  PT License Number: [id number]    12/15/2020

## 2020-12-22 ENCOUNTER — HOSPITAL ENCOUNTER (OUTPATIENT)
Dept: PHYSICAL THERAPY | Age: 55
Discharge: HOME | End: 2020-12-22
Payer: COMMERCIAL

## 2020-12-22 PROCEDURE — 97140 MANUAL THERAPY 1/> REGIONS: CPT | Performed by: PHYSICAL THERAPIST

## 2020-12-22 PROCEDURE — 97110 THERAPEUTIC EXERCISES: CPT | Performed by: PHYSICAL THERAPIST

## 2020-12-22 PROCEDURE — 20561 NDL INSJ W/O NJX 3+ MUSC: CPT | Performed by: PHYSICAL THERAPIST

## 2020-12-22 NOTE — PROGRESS NOTES
PHYSICAL THERAPY -DRY NEEDLING  DAILY TREATMENT NOTE-PROGRESS NOTE    Patient Name: Rea Germain  Date:2020  : 1965  [x]  Patient  Verified  Payor: [address] / Plan: [facility] MEDICAL [place] [address] [facility] [address] / Product Type: Commerical /    In time:515 P    Out time:630 P    Total Treatment Time (min) 75  Total Timed Codes (min)65  Visit #:  8    Treatment Area: Low back pain [M54.5]    SUBJECTIVE  Pain Level (on 0 to 10 scale): 0 / 10   Medication Changes/New allergies or changes in medical history, any new surgeries or procedures? NO    If yes, update Summary List   Subjective Functional Status/Changes:  []  No changes reported     Pt reports she is 50-75% better since initial evaluation. OBJECTIVE  Modality:  10 min of ice used post-tx to decrease irritation, pain, and prevent soreness    *Skin assessment post-treatment:    [x]  intact    [x]  redness- no adverse reaction     []redness  adverse reaction:          15 min  (untimed) []: 1-2 muscles  [x]: 3+ muscles   DN as noted in separate note below   Rationale:      decrease pain, increase ROM, increase tissue extensibility and decrease trigger points to improve patient's ability to perform ADL's. 20 min Manual Therapy:  [x]  See flow sheet: Soft tissue mobilization to above areas    Rationale:      increase ROM, increase strength and improve functional mobility to improve the patients ability to perform ADL's    30 min Therex y:  [x]  See flow sheet:     Rationale:      increase ROM, increase strength and improve functional mobility to improve the patients ability to perform ADL's    With Manual  Patient Education:  YES  Reviewed HEP-re-printed HEP       Other Objective Measures:    Observation: left lumbar shift  Significant scoliotic curve noted-rib hump noted on left  Lordosis                      []? Inc    [x]? Dec  Pelvic symmetry          [x]? WNL  []?  Other:     Gait: WNL     AROM lumbar spine full and pain-free all planes     Strength  *5/5 unless noted below     L(0-5) R (0-5)   Hip Flexion (L1,2)       Knee Extension (L3,4)       Knee Flexion (S1,2)       Ankle Dorsiflexion (L4)       Great Toe Extension (L5)       Ankle Plantarflexion (S1)       Ankle Eversion (S1)       Lower Abdominals 4 4   Paraspinals       Hip Extensors 4+ 4+   Hip Abductors 4+ 4+   Hip ER's       Hip IR's          Tenderness to palpation: right glute med, glute max, piriformis, lumbar parapsinals     Special Tests:   -SLR  All special tests for right hip neg for OA/labral pathology of (R) hip  Lumbar distraction-no change in symptoms. Flexibility: dec piriformis tyler right >left     Joint mobility:  Hypomobility L1-L5, pain with testing all segments. Hypomobility noted tyler sacral base. No pain with testing. Dry Needling Procedure Note    Procedure: An intramuscular manual therapy (dry needling) and a neuro-muscular re-education treatment was done to deactivate myofascial trigger points, with a solid filament needle, under aseptic technique. Indication(s): [] Muscle spasms [] Headaches  [] TMJD      [] Muscle imbalances [x] Myofascial pain & dysfunction     [] Decreased ROM    TIMEOUT PERFORMED:    515  (time the timeout was completed)   Sandra Connor (who was present)    Informed Consent Obtained: [x] Verbal    The following items were reviewed with the patient:  -Purpose of dry needling, side effects, possible complications, and the informed consent   -The need to report the use of blood thinners and/or immunosuppressant medications  -How to respond to possible adverse effects of the treatment  -Self treatment of post needling soreness: ice/heat, stretching, and activity modification.   -Opportunity was given to ask any questions, all questions were answered    Treatment:  The following muscles were treated today:  Prone 1 pillow under hips.   Tyler: lumbar multifidi L4 and L5  Right: .30 x 60 mm needles used to glute med, glute min, piriformis, glute max  Left:  *hemostasis applied after each needle was applied. Patients response:   [x]  LTRs  []  Muscle Relaxation  [x]  Pain Relief   []  Decreased HAs [x]  Post-needling soreness []  Increased ROM      Post Treatment Pain Level (on 0 to 10) scale:  0 / 10   ASSESSMENT       [x]  See Progress Note/Recertification      Progress toward goals / Updated goals:     Short Term Goals: To be accomplished in 5 treatments:  -Independent in HEP as evidenced on ability to perform at least 5 exercises from HEP using proper form without verbal cuing. -MET  -Pain less than or equal to 8/10 at worst to allow patient to perform ADL's with greater ease-MET  -Demostrate proper posture in order to decrease lumbar pain-MET  -Pt will report compliance with icing 1-2x/day in order to decrease inflammation-MET     Long Term Goals: To be accomplished in 3 months:   -Pt will be able to walk at least 3 miles to allow pt to have family time -PROGRESSING-able to walk 3 miles with just mild discomfort  -Pt will be able to sit for at least two hours without back pain to allow pt to -PROGRESSING-has not attempted. -Pt will report leg pain is improved by 50% to allow pt to exercise with greater ease. -MET    Pt with significant improvements in pain levels. No longer experiencing radicular pain. Will benefit from further PT with focus on continued DN and gradual introduction of LE/core standing exercises in order to allow pt for full return to gym activities. PLAN  []  Upgrade activities as tolerated YES Continue plan of care   []  Discharge due to :    [x]  Other: Continue 1x/week through month of Abhi then transition to HEP. Endy Paige.  Vanessa, PT, DPT, CMTPT  PT License Number: [id number]    12/22/2020

## 2020-12-29 ENCOUNTER — HOSPITAL ENCOUNTER (OUTPATIENT)
Dept: PHYSICAL THERAPY | Age: 55
Discharge: HOME | End: 2020-12-29
Payer: COMMERCIAL

## 2020-12-29 PROCEDURE — 97140 MANUAL THERAPY 1/> REGIONS: CPT | Performed by: PHYSICAL THERAPIST

## 2020-12-29 PROCEDURE — 20561 NDL INSJ W/O NJX 3+ MUSC: CPT | Performed by: PHYSICAL THERAPIST

## 2020-12-29 NOTE — PROGRESS NOTES
PHYSICAL THERAPY -DRY NEEDLING  DAILY TREATMENT NOTE    Patient Name: Benito Sicard  Date:2020  : 1965  [x]  Patient  Verified  Payor: [employer] [place] / Plan: [facility] MEDICAL [place] [address] / Product Type: Commerical /    In time:600 P       Out time:650 P   Total Treatment Time (min) 50  Total Timed Codes (min):40  Visit #:  9    Treatment Area: Low back pain [M54.5]    SUBJECTIVE  Pain Level (on 0 to 10 scale): 0/ 10   Medication Changes/New allergies or changes in medical history, any new surgeries or procedures? NO    If yes, update Summary List   Subjective Functional Status/Changes:  []  No changes reported   Much better. OBJECTIVE  Modality:  10 min of ice used post-tx to decrease irritation, pain, and prevent soreness    *Skin assessment post-treatment:    [x]  intact    [x]  redness- no adverse reaction     []redness  adverse reaction:          15 min  (untimed) []: 1-2 muscles  [x]: 3+ muscles   DN as noted in separate note below   Rationale:      decrease pain, increase ROM, increase tissue extensibility and decrease trigger points to improve patient's ability to perform ADL's.    25 min Manual Therapy:  [x]  See flow sheet: Soft tissue mobilization to above areas    Rationale:      increase ROM, increase strength and improve functional mobility to improve the patients ability to perform ADL's    With Manual  Patient Education:  YES  Reviewed HEP       Other Objective Measures:                Dry Needling Procedure Note    Procedure: An intramuscular manual therapy (dry needling) and a neuro-muscular re-education treatment was done to deactivate myofascial trigger points, with a solid filament needle, under aseptic technique.     Indication(s): [] Muscle spasms [] Headaches  [] TMJD      [] Muscle imbalances [x] Myofascial pain & dysfunction     [] Decreased ROM    TIMEOUT PERFORMED:    600  (time the timeout was completed)   Jonathan Neri (who was present)    Informed Consent Obtained: [x] Verbal    The following items were reviewed with the patient:  -Purpose of dry needling, side effects, possible complications, and the informed consent   -The need to report the use of blood thinners and/or immunosuppressant medications  -How to respond to possible adverse effects of the treatment  -Self treatment of post needling soreness: ice/heat, stretching, and activity modification.   -Opportunity was given to ask any questions, all questions were answered    Treatment:  The following muscles were treated today:  Prone 1 pillow under hips. Tyler: lumbar multifidi L4 and L5. .30 x 60 mm needles used to glute med, glute min, piriformis, glute max  Right:   Left:  *hemostasis applied after each needle was applied. Patients response:   [x]  LTRs  []  Muscle Relaxation  [x]  Pain Relief   []  Decreased HAs [x]  Post-needling soreness []  Increased ROM      Post Treatment Pain Level (on 0 to 10) scale:  0 / 10   ASSESSMENT       [x]  See Progress Note/Recertification   Patient will continue to benefit from skilled PT services to modify and progress therapeutic interventions, address functional mobility deficits, address ROM deficits, address strength deficits, analyze and address soft tissue restrictions, analyze and modify body mechanics/ergonomics and instruct in home and community integration to attain remaining goals. Progress toward goals / Updated goals:       PLAN  []  Upgrade activities as tolerated YES Continue plan of care   []  Discharge due to :    []  Other:      Miguel Mendez PT, DPT, CMTPT  PT License Number: [id number]    12/29/2020

## 2021-01-05 ENCOUNTER — HOSPITAL ENCOUNTER (OUTPATIENT)
Dept: PHYSICAL THERAPY | Age: 56
Discharge: HOME | End: 2021-01-05
Payer: COMMERCIAL

## 2021-01-05 PROCEDURE — 20561 NDL INSJ W/O NJX 3+ MUSC: CPT | Performed by: PHYSICAL THERAPIST

## 2021-01-05 PROCEDURE — 97140 MANUAL THERAPY 1/> REGIONS: CPT | Performed by: PHYSICAL THERAPIST

## 2021-01-05 NOTE — PROGRESS NOTES
PHYSICAL THERAPY -DRY NEEDLING  DAILY TREATMENT NOTE    Patient Name: Tremayne Hinds  Date:2021  : 1965  [x]  Patient  Verified  Payor: [address] / Plan: [facility] MEDICAL [place] [address] [facility] [facility] [address] / Product Type: Commerical /    In time:1115 A      Out time:1210 P  Total Treatment Time (min) 55  Total Timed Codes (min):45  Visit #:  10    Treatment Area: Low back pain [M54.5]    SUBJECTIVE  Pain Level (on 0 to 10 scale): 0/ 10   Medication Changes/New allergies or changes in medical history, any new surgeries or procedures? NO    If yes, update Summary List   Subjective Functional Status/Changes:  []  No changes reported   Much better. OBJECTIVE  Modality:  10 min of ice used post-tx to decrease irritation, pain, and prevent soreness    *Skin assessment post-treatment:    [x]  intact    [x]  redness- no adverse reaction     []redness  adverse reaction:          15 min  (untimed) []: 1-2 muscles  [x]: 3+ muscles   DN as noted in separate note below   Rationale:      decrease pain, increase ROM, increase tissue extensibility and decrease trigger points to improve patient's ability to perform ADL's. 30 min Manual Therapy:  [x]  See flow sheet: Soft tissue mobilization to above areas    Rationale:      increase ROM, increase strength and improve functional mobility to improve the patients ability to perform ADL's    With Manual  Patient Education:  YES  Reviewed HEP       Other Objective Measures:                Dry Needling Procedure Note    Procedure: An intramuscular manual therapy (dry needling) and a neuro-muscular re-education treatment was done to deactivate myofascial trigger points, with a solid filament needle, under aseptic technique.     Indication(s): [] Muscle spasms [] Headaches  [] TMJD      [] Muscle imbalances [x] Myofascial pain & dysfunction     [] Decreased ROM    TIMEOUT PERFORMED:    7483  (time the timeout was completed)   Sunil Alfonso (anel was present)    Informed Consent Obtained: [x] Verbal    The following items were reviewed with the patient:  -Purpose of dry needling, side effects, possible complications, and the informed consent   -The need to report the use of blood thinners and/or immunosuppressant medications  -How to respond to possible adverse effects of the treatment  -Self treatment of post needling soreness: ice/heat, stretching, and activity modification.   -Opportunity was given to ask any questions, all questions were answered    Treatment:  The following muscles were treated today:  Prone 1 pillow under hips. Tlyer: lumbar multifidi L4 and L5. .30 x 60 mm needles used to glute med, glute min, piriformis, glute max  Right:   Left:  *hemostasis applied after each needle was applied. Patients response:   [x]  LTRs  []  Muscle Relaxation  [x]  Pain Relief   []  Decreased HAs [x]  Post-needling soreness []  Increased ROM      Post Treatment Pain Level (on 0 to 10) scale:  0 / 10   ASSESSMENT       [x]  See Progress Note/Recertification   Patient will continue to benefit from skilled PT services to modify and progress therapeutic interventions, address functional mobility deficits, address ROM deficits, address strength deficits, analyze and address soft tissue restrictions, analyze and modify body mechanics/ergonomics and instruct in home and community integration to attain remaining goals. Progress toward goals / Updated goals:       PLAN  []  Upgrade activities as tolerated YES Continue plan of care   []  Discharge due to :    []  Other:      Kin Peters.  Vanessa PT, DPT, CMTPT  PT License Number: [id number]    1/5/2021

## 2021-01-06 ENCOUNTER — OFFICE VISIT (OUTPATIENT)
Dept: OBGYN CLINIC | Age: 56
End: 2021-01-06
Payer: COMMERCIAL

## 2021-01-06 VITALS
WEIGHT: 132 LBS | SYSTOLIC BLOOD PRESSURE: 100 MMHG | DIASTOLIC BLOOD PRESSURE: 64 MMHG | BODY MASS INDEX: 21.99 KG/M2 | HEIGHT: 65 IN

## 2021-01-06 DIAGNOSIS — Z01.419 WELL WOMAN EXAM WITH ROUTINE GYNECOLOGICAL EXAM: Primary | ICD-10-CM

## 2021-01-06 PROCEDURE — 99396 PREV VISIT EST AGE 40-64: CPT | Performed by: OBSTETRICS & GYNECOLOGY

## 2021-01-06 RX ORDER — ZOLPIDEM TARTRATE 5 MG/1
TABLET ORAL
COMMUNITY
Start: 2020-11-19 | End: 2022-01-20 | Stop reason: SDUPTHER

## 2021-01-06 RX ORDER — VENLAFAXINE HYDROCHLORIDE 37.5 MG/1
CAPSULE, EXTENDED RELEASE ORAL
COMMUNITY
Start: 2020-11-24 | End: 2021-11-18 | Stop reason: ALTCHOICE

## 2021-01-06 NOTE — PROGRESS NOTES
Annual exam ages postmenopausal    Jasper Jj is a No obstetric history on file. ,  54 y.o. female [facility] No LMP recorded. (Menstrual status: Menopause). .    She presents for her annual checkup. She is having no significant problems. Hx breast cancer; NAD 10 years out; currently on effexor for menopausal sxs  Was furloughed but back at work         Menstrual status:    Now menopausal  She is not using ERT    Sexual history:    She  reports being sexually active and has had partner(s) who are Male. She reports using the following method of birth control/protection: Surgical.    Medical conditions:    Since her last annual GYN exam about one year ago, she has not the following changes in her health history: none. Pap and Mammogram History:    Her most recent Pap smear was normal, obtained 1 year(s) ago. The patient had a recent mammogram in 2020 which was negative for malignancy. Breast Cancer History/Substance Abuse: negative    Osteoporosis History:    Family history does not include a first or second degree relative with osteopenia or osteoporosis. Past Medical History:   Diagnosis Date    Breast cancer ([address] 2009    LEFT, DCIS    Cancer ([facility] [address]     left breast cancer    Degenerative lumbar disc     Fibroid uterus     Radiation therapy complication      Past Surgical History:   Procedure Laterality Date    HX BREAST LUMPECTOMY Left     HX GYN          CO MASTECTOMY, PARTIAL         Current Outpatient Medications   Medication Sig Dispense Refill    venlafaxine-SR (EFFEXOR-XR) 37.5 mg capsule       zolpidem (AMBIEN) 5 mg tablet TK 1/2 TO 1 T PO QHS      cholecalciferol, vitamin D3, (VITAMIN D3) 2,000 unit tab Take 2,000 Units by mouth daily.  gabapentin (NEURONTIN) 100 mg capsule Take 1 Cap by mouth nightly. Max Daily Amount: 100 mg. 7 Cap 0    gabapentin (NEURONTIN) 300 mg capsule Take 1 Cap by mouth nightly.  Max Daily Amount: 300 mg. 30 Cap 6    ibuprofen (MOTRIN) 200 mg tablet Take  by mouth.  meclizine (ANTIVERT) 25 mg tablet Take  by mouth three (3) times daily as needed.  zolpidem (AMBIEN) 10 mg tablet Take  by mouth nightly as needed for Sleep.  loratadine 10 mg cap Take  by mouth.  valACYclovir (VALTREX) 500 mg tablet Take  by mouth two (2) times a day.  rizatriptan (MAXALT) 10 mg tablet Take 10 mg by mouth once as needed for Migraine. May repeat in 2 hours if needed       Allergies: Penicillin g     Tobacco History:  reports that she has never smoked. She has never used smokeless tobacco.  Alcohol Abuse:  reports current alcohol use. Drug Abuse:  reports no history of drug use.     Family Medical/Cancer History:   Family History   Problem Relation Age of Onset   Michelle.Gisselle Cancer Father         Lung    Cancer Other         father history of colon polyps    Breast Cancer Other     Breast Cancer Maternal Aunt         Review of Systems - History obtained from the patient  Constitutional: negative for weight loss, fever, night sweats  HEENT: negative for hearing loss, earache, congestion, snoring, sorethroat  CV: negative for chest pain, palpitations, edema  Resp: negative for cough, shortness of breath, wheezing  GI: negative for change in bowel habits, abdominal pain, black or bloody stools  : negative for frequency, dysuria, hematuria, vaginal discharge  MSK: negative for back pain, joint pain, muscle pain  Breast: negative for breast lumps, nipple discharge, galactorrhea  Skin :negative for itching, rash, hives  Neuro: negative for dizziness, headache, confusion, weakness  Psych: negative for anxiety, depression, change in mood  Heme/lymph: negative for bleeding, bruising, pallor    Physical Exam    Visit Vitals  /64   Ht 5' 5\" (1.651 m)   Wt 132 lb (59.9 kg)   BMI 21.97 kg/m²       Constitutional  · Appearance: well-nourished, well developed, alert, in no acute distress    HENT  · Head and Face: appears normal    Chest  · Respiratory Effort: breathing unlabored      Breasts  · Inspection of Breasts: breasts symmetrical, no skin changes, no discharge present, nipple appearance normal, no skin retraction present  · Palpation of Breasts and Axillae: no masses present on palpation, no breast tenderness  · Axillary Lymph Nodes: no lymphadenopathy present    Gastrointestinal  · Abdominal Examination: abdomen non-tender to palpation, normal bowel sounds, no masses present  · Liver and spleen: no hepatomegaly present, spleen not palpable  · Hernias: no hernias identified    Skin  · General Inspection: no rash, no lesions identified    Neurologic/Psychiatric  · Mental Status:  · Orientation: grossly oriented to person, place and time  · Mood and Affect: mood normal, affect appropriate    Genitourinary  · External Genitalia: normal appearance for age, no discharge present, no tenderness present, no inflammatory lesions present, no masses present, no atrophy present  · Vagina: normal vaginal vault without central or paravaginal defects, no discharge present, no inflammatory lesions present, no masses present  · Bladder: non-tender to palpation  · Urethra: appears normal  · Cervix: normal   · Uterus: normal size, shape and consistency  · Adnexa: no adnexal tenderness present, no adnexal masses present  · Perineum: perineum within normal limits, no evidence of trauma, no rashes or skin lesions present  · Anus: anus within normal limits, no hemorrhoids present  · Inguinal Lymph Nodes: no lymphadenopathy present    Assessment:  Routine gynecologic examination  Hx breast cancer; NAD 10 years  Menopausal sxs  Her current medical status is satisfactory with no evidence of significant gynecologic issues. Plan:  Pap done today  Encouraged use of \"Uber\" or any silicone based lubricant.   Recommend continued effexor  Patient offered and completed [employer] genetic screening questionnaire and no changes in personal and family pertinent medical history. Patient declines presence of chaperone during today's visit.    Counseled re: diet, exercise, healthy lifestyle  Return for yearly wellness visits  Rec annual mammogram

## 2021-01-12 ENCOUNTER — APPOINTMENT (OUTPATIENT)
Dept: PHYSICAL THERAPY | Age: 56
End: 2021-01-12
Payer: COMMERCIAL

## 2021-01-14 ENCOUNTER — HOSPITAL ENCOUNTER (OUTPATIENT)
Dept: PHYSICAL THERAPY | Age: 56
Discharge: HOME | End: 2021-01-14
Payer: COMMERCIAL

## 2021-01-14 PROCEDURE — 20561 NDL INSJ W/O NJX 3+ MUSC: CPT | Performed by: PHYSICAL THERAPIST

## 2021-01-14 PROCEDURE — 97140 MANUAL THERAPY 1/> REGIONS: CPT | Performed by: PHYSICAL THERAPIST

## 2021-01-14 NOTE — PROGRESS NOTES
PHYSICAL THERAPY -DRY NEEDLING  DAILY TREATMENT NOTE    Patient Name: Janey Garcia  Date:2021  : 1965  [x]  Patient  Verified  Payor: [employer] [place] / Plan: [facility] MEDICAL [place] [address] / Product Type: Commerical /    In time:520 P       Out time:615 P   Total Treatment Time (min) 55  Total Timed Codes (min):45  Visit #:  11    Treatment Area: Low back pain [M54.5]    SUBJECTIVE  Pain Level (on 0 to 10 scale): 0/ 10   Medication Changes/New allergies or changes in medical history, any new surgeries or procedures? NO    If yes, update Summary List   Subjective Functional Status/Changes:  []  No changes reported   Pt improving. Got a little sore today because she was sitting more. OBJECTIVE  Modality:  10 min of ice used post-tx to decrease irritation, pain, and prevent soreness    *Skin assessment post-treatment:    [x]  intact    [x]  redness- no adverse reaction     []redness  adverse reaction:          15 min  (untimed) []: 1-2 muscles  [x]: 3+ muscles   DN as noted in separate note below   Rationale:      decrease pain, increase ROM, increase tissue extensibility and decrease trigger points to improve patient's ability to perform ADL's. 30 min Manual Therapy:  [x]  See flow sheet: Soft tissue mobilization to above areas    Rationale:      increase ROM, increase strength and improve functional mobility to improve the patients ability to perform ADL's    With Manual  Patient Education:  YES  Reviewed HEP       Other Objective Measures:                Dry Needling Procedure Note    Procedure: An intramuscular manual therapy (dry needling) and a neuro-muscular re-education treatment was done to deactivate myofascial trigger points, with a solid filament needle, under aseptic technique.     Indication(s): [] Muscle spasms [] Headaches  [] TMJD      [] Muscle imbalances [x] Myofascial pain & dysfunction     [] Decreased ROM    TIMEOUT PERFORMED: 520 P   (time the timeout was completed)   Jamie Hudson  (who was present)    Informed Consent Obtained: [x] Verbal    The following items were reviewed with the patient:  -Purpose of dry needling, side effects, possible complications, and the informed consent   -The need to report the use of blood thinners and/or immunosuppressant medications  -How to respond to possible adverse effects of the treatment  -Self treatment of post needling soreness: ice/heat, stretching, and activity modification.   -Opportunity was given to ask any questions, all questions were answered    Treatment:  The following muscles were treated today:  Prone 1 pillow under hips. Tyler: lumbar multifidi L4 and L5. .30 x 60 mm needles used to glute med, glute min, piriformis, glute max  Right:   Left:  *hemostasis applied after each needle was applied. Patients response:   [x]  LTRs  []  Muscle Relaxation  [x]  Pain Relief   []  Decreased HAs [x]  Post-needling soreness []  Increased ROM      Post Treatment Pain Level (on 0 to 10) scale:  0 / 10   ASSESSMENT       [x]  See Progress Note/Recertification   Patient will continue to benefit from skilled PT services to modify and progress therapeutic interventions, address functional mobility deficits, address ROM deficits, address strength deficits, analyze and address soft tissue restrictions, analyze and modify body mechanics/ergonomics and instruct in home and community integration to attain remaining goals. Progress toward goals / Updated goals:       PLAN  []  Upgrade activities as tolerated YES Continue plan of care   []  Discharge due to :    []  Other:      Garland Granados.  Vanessa PT, DPT, CMTPT  PT License Number: [id number]    1/14/2021

## 2021-01-18 ENCOUNTER — APPOINTMENT (OUTPATIENT)
Dept: PHYSICAL THERAPY | Age: 56
End: 2021-01-18
Payer: COMMERCIAL

## 2021-01-19 ENCOUNTER — APPOINTMENT (OUTPATIENT)
Dept: PHYSICAL THERAPY | Age: 56
End: 2021-01-19
Payer: COMMERCIAL

## 2021-01-21 ENCOUNTER — HOSPITAL ENCOUNTER (OUTPATIENT)
Dept: PHYSICAL THERAPY | Age: 56
Discharge: HOME | End: 2021-01-21
Payer: COMMERCIAL

## 2021-01-21 PROCEDURE — 97140 MANUAL THERAPY 1/> REGIONS: CPT | Performed by: PHYSICAL THERAPIST

## 2021-01-21 PROCEDURE — 20561 NDL INSJ W/O NJX 3+ MUSC: CPT | Performed by: PHYSICAL THERAPIST

## 2021-01-21 NOTE — PROGRESS NOTES
PHYSICAL THERAPY -DRY NEEDLING  DAILY TREATMENT NOTE-PROGRESS NOTE    Patient Name: Alberto Case  Date:2021  : 1965  [x]  Patient  Verified  Payor: [address] / Plan: [facility] MEDICAL [employer] [address] / Product Type: Commerical /    In time:600 P  Out time:655 P  Total Treatment Time (min) 55  Total Timed Codes (min)45  Visit #:  12    Treatment Area: Low back pain [M54.5]    SUBJECTIVE  Pain Level (on 0 to 10 scale): 0 / 10   Medication Changes/New allergies or changes in medical history, any new surgeries or procedures? NO    If yes, update Summary List   Subjective Functional Status/Changes:  []  No changes reported     Pt reports she has been compliant with HEP, did it 5 times last week. Feeling good. A bit nervous about graduating soon. OBJECTIVE  Modality:  10 min of ice used post-tx to decrease irritation, pain, and prevent soreness    *Skin assessment post-treatment:    [x]  intact    [x]  redness- no adverse reaction     []redness  adverse reaction:          15 min  (untimed) []: 1-2 muscles  [x]: 3+ muscles   DN as noted in separate note below   Rationale:      decrease pain, increase ROM, increase tissue extensibility and decrease trigger points to improve patient's ability to perform ADL's. 30 min Manual Therapy:  [x]  See flow sheet: Soft tissue mobilization to above areas    Rationale:      increase ROM, increase strength and improve functional mobility to improve the patients ability to perform ADL's    With Manual  Patient Education:  YES  Reviewed HEP       Other Objective Measures:    Observation: left lumbar shift  Significant scoliotic curve noted-rib hump noted on left  Lordosis                      []? Inc    [x]? Dec  Pelvic symmetry          [x]? WNL  []?  Other:     Gait: WNL     AROM lumbar spine full and pain-free all planes     Strength  *5/5 unless noted below     L(0-5) R (0-5)   Hip Flexion (L1,2)       Knee Extension (L3,4)       Knee Flexion (S1,2)       Ankle Dorsiflexion (L4)       Great Toe Extension (L5)       Ankle Plantarflexion (S1)       Ankle Eversion (S1)       Lower Abdominals 4+ 4+   Paraspinals       Hip Extensors 4+ 4+   Hip Abductors 4+ 4+   Hip ER's       Hip IR's          Tenderness to palpation: tyler glute med, glute max, piriformis, lumbar parapsinals        Flexibility: WNL     Joint mobility:  Hypomobility L1-L5, pain with testing all segments. Hypomobility noted tyler sacral base. No pain with testing. Dry Needling Procedure Note    Procedure: An intramuscular manual therapy (dry needling) and a neuro-muscular re-education treatment was done to deactivate myofascial trigger points, with a solid filament needle, under aseptic technique. Indication(s): [] Muscle spasms [] Headaches  [] TMJD      [] Muscle imbalances [x] Myofascial pain & dysfunction     [] Decreased ROM    TIMEOUT PERFORMED:    600 (time the timeout was completed)   Pham Call (who was present)    Informed Consent Obtained: [x] Verbal    The following items were reviewed with the patient:  -Purpose of dry needling, side effects, possible complications, and the informed consent   -The need to report the use of blood thinners and/or immunosuppressant medications  -How to respond to possible adverse effects of the treatment  -Self treatment of post needling soreness: ice/heat, stretching, and activity modification.   -Opportunity was given to ask any questions, all questions were answered    Treatment:  The following muscles were treated today:  Prone 1 pillow under hips. Tyler: lumbar multifidi L4 and L5. .30 x 60 mm needles used to glute med, glute min, piriformis, glute max  Right:   Left:  *hemostasis applied after each needle was applied.      Patients response:   [x]  LTRs  []  Muscle Relaxation  [x]  Pain Relief   []  Decreased HAs [x]  Post-needling soreness []  Increased ROM      Post Treatment Pain Level (on 0 to 10) scale:  0 / 10   ASSESSMENT       [x]  See Progress Note/Recertification      Progress toward goals / Updated goals:     Short Term Goals: To be accomplished in 5 treatments:  -Independent in HEP as evidenced on ability to perform at least 5 exercises from HEP using proper form without verbal cuing. -MET  -Pain less than or equal to 8/10 at worst to allow patient to perform ADL's with greater ease-MET  -Demostrate proper posture in order to decrease lumbar pain-MET  -Pt will report compliance with icing 1-2x/day in order to decrease inflammation-MET     Long Term Goals: To be accomplished in 3 months:   -Pt will be able to walk at least 3 miles to allow pt to have family time -MET  -Pt will be able to sit for at least two hours without back pain to allow pt to -PROGRESSING-has not attempted. -Pt will report leg pain is improved by 50% to allow pt to exercise with greater ease. -MET    Pt ability to resume cardio portion of workout. Able to tolerate weight bearing strengthening exercises. When pain levels come, very mild and tolerable at this point. PLAN  []  Upgrade activities as tolerated YES Continue plan of care   []  Discharge due to :    [x]  Other: D/C to HEP next session. Cristino Mendez, PT, DPT, CMTPT  PT License Number: [id number]    1/21/2021

## 2021-01-28 ENCOUNTER — HOSPITAL ENCOUNTER (OUTPATIENT)
Dept: PHYSICAL THERAPY | Age: 56
Discharge: HOME | End: 2021-01-28
Payer: COMMERCIAL

## 2021-01-28 PROCEDURE — 97140 MANUAL THERAPY 1/> REGIONS: CPT | Performed by: PHYSICAL THERAPIST

## 2021-01-28 PROCEDURE — 20561 NDL INSJ W/O NJX 3+ MUSC: CPT | Performed by: PHYSICAL THERAPIST

## 2021-01-28 NOTE — PROGRESS NOTES
PHYSICAL THERAPY -DRY NEEDLING  DAILY TREATMENT NOTE    Patient Name: Maci Keller  Date:2021  : 1965  [x]  Patient  Verified  Payor: [id number] / Plan: [facility] MEDICAL [place] [address] / Product Type: Commerical /    In time:600 P        Out time: 700 P   Total Treatment Time (min) 60  Total Timed Codes (min):50  Visit #:  13    Treatment Area: Low back pain [M54.5]    SUBJECTIVE  Pain Level (on 0 to 10 scale): 0/ 10   Medication Changes/New allergies or changes in medical history, any new surgeries or procedures? NO    If yes, update Summary List   Subjective Functional Status/Changes:  []  No changes reported   Pt feels ready to graduate at this point. OBJECTIVE  Modality:  10 min of ice used post-tx to decrease irritation, pain, and prevent soreness    *Skin assessment post-treatment:    [x]  intact    [x]  redness- no adverse reaction     []redness  adverse reaction:          15 min  (untimed) []: 1-2 muscles  [x]: 3+ muscles   DN as noted in separate note below   Rationale:      decrease pain, increase ROM, increase tissue extensibility and decrease trigger points to improve patient's ability to perform ADL's. 35 min Manual Therapy:  [x]  See flow sheet: Soft tissue mobilization to above areas    Rationale:      increase ROM, increase strength and improve functional mobility to improve the patients ability to perform ADL's    With Manual  Patient Education:  YES  Reviewed HEP       Other Objective Measures:                Dry Needling Procedure Note    Procedure: An intramuscular manual therapy (dry needling) and a neuro-muscular re-education treatment was done to deactivate myofascial trigger points, with a solid filament needle, under aseptic technique.     Indication(s): [] Muscle spasms [] Headaches  [] TMJD      [] Muscle imbalances [x] Myofascial pain & dysfunction     [] Decreased ROM    TIMEOUT PERFORMED:    600  (time the timeout was completed)   Kay Wayne  (who was present)    Informed Consent Obtained: [x] Verbal    The following items were reviewed with the patient:  -Purpose of dry needling, side effects, possible complications, and the informed consent   -The need to report the use of blood thinners and/or immunosuppressant medications  -How to respond to possible adverse effects of the treatment  -Self treatment of post needling soreness: ice/heat, stretching, and activity modification.   -Opportunity was given to ask any questions, all questions were answered    Treatment:  The following muscles were treated today:  Prone 1 pillow under hips. Tyler: lumbar multifidi L4 and L5. .30 x 60 mm needles used to glute med, glute min, piriformis, glute max  Right:   Left:  *hemostasis applied after each needle was applied. Patients response:   [x]  LTRs  []  Muscle Relaxation  [x]  Pain Relief   []  Decreased HAs [x]  Post-needling soreness []  Increased ROM      Post Treatment Pain Level (on 0 to 10) scale:  0 / 10   ASSESSMENT         Progress towards goals / Updated goals:  Please see D/C note    PLAN  []  Upgrade activities as tolerated     [x]  Continue plan of care  []  Update interventions per flow sheet       [x]  Discharge due to:_   [x]  Other: Please see D/C note      Orpha Porsha.  Vanessa PT, DPT, CMTPT  PT License Number: [id number]    1/28/2021

## 2021-02-01 NOTE — ANCILLARY DISCHARGE INSTRUCTIONS
[facility] Physical Therapy [address] [facility] [address] [facility] Phone: [phone] Fax: [phone] Discharge Summary 2-15 Patient name: Suzette Tinajero  : 1965 Provider#: [id number] Referral source: Ana Coto MD     
Medical/Treatment Diagnosis: Low back pain [M54.5] Prior Hospitalization: see medical history Comorbidities: see evaluation Prior Level of Function:see evaluation Medications: Verified on Patient Summary List 
 
Start of Care: 20    Onset Date:see evaluation Visits from Start of Care: 14   Missed Visits:see connect care Reporting Period : 20  to 21 Goal Status Short Term Goals: To be accomplished in 5 treatments: 
-Independent in HEP as evidenced on ability to perform at least 5 exercises from HEP using proper form without verbal cuing. -MET 
-Pain less than or equal to 8/10 at worst to allow patient to perform ADL's with greater ease-MET 
-Demostrate proper posture in order to decrease lumbar pain-MET 
-Pt will report compliance with icing 1-2x/day in order to decrease inflammation-MET Long Term Goals: To be accomplished in 3 months:  
-Pt will be able to walk at least 3 miles to allow pt to have family time -MET 
-Pt will be able to sit for at least two hours without back pain to allow pt to -PROGRESSING-has not attempted. -Pt will report leg pain is improved by 50% to allow pt to exercise with greater ease. -MET Assessment/Summary of care: Pt with 14 skilled PT visits at this time. Pt has shown improvement in ROM, strength and ability to return to gym activities. Pt has met/progressing toward all of goals. Pt ready for D/C to HEP. RECOMMENDATIONS: 
[x]Discontinue therapy:[x]Patient has reached or is progressing toward set goals []Patient is non-compliant or has abdicated 
   []Due to lack of appreciable progress towards set goals Zo Mendez, PT, DPT, CMTPT  2021 1:28 PM

## 2021-09-13 LAB
CHOLEST SERPL-MCNC: 260 MG/DL
GLUCOSE SERPL-MCNC: 84 MG/DL (ref 65–100)
HDLC SERPL-MCNC: 94 MG/DL
LDLC SERPL CALC-MCNC: 152 MG/DL (ref 0–100)
TRIGL SERPL-MCNC: 70 MG/DL (ref ?–150)

## 2021-10-12 ENCOUNTER — TRANSCRIBE ORDER (OUTPATIENT)
Dept: SCHEDULING | Age: 56
End: 2021-10-12

## 2021-10-12 DIAGNOSIS — Z12.31 SCREENING MAMMOGRAM FOR HIGH-RISK PATIENT: Primary | ICD-10-CM

## 2021-11-18 ENCOUNTER — OFFICE VISIT (OUTPATIENT)
Dept: PRIMARY CARE CLINIC | Age: 56
End: 2021-11-18
Payer: COMMERCIAL

## 2021-11-18 VITALS
HEIGHT: 65 IN | OXYGEN SATURATION: 98 % | TEMPERATURE: 97.8 F | HEART RATE: 67 BPM | BODY MASS INDEX: 22.49 KG/M2 | SYSTOLIC BLOOD PRESSURE: 103 MMHG | DIASTOLIC BLOOD PRESSURE: 64 MMHG | WEIGHT: 135 LBS | RESPIRATION RATE: 16 BRPM

## 2021-11-18 DIAGNOSIS — G47.00 INSOMNIA, UNSPECIFIED TYPE: ICD-10-CM

## 2021-11-18 DIAGNOSIS — Z00.00 WELL WOMAN EXAM (NO GYNECOLOGICAL EXAM): Primary | ICD-10-CM

## 2021-11-18 PROCEDURE — 99386 PREV VISIT NEW AGE 40-64: CPT | Performed by: FAMILY MEDICINE

## 2021-11-18 PROCEDURE — 99203 OFFICE O/P NEW LOW 30 MIN: CPT | Performed by: FAMILY MEDICINE

## 2021-11-18 RX ORDER — IBUPROFEN 400 MG/1
TABLET ORAL
COMMUNITY

## 2021-11-18 RX ORDER — LORATADINE 10 MG/1
10 TABLET ORAL
COMMUNITY

## 2021-11-18 RX ORDER — VALACYCLOVIR HYDROCHLORIDE 500 MG/1
TABLET, FILM COATED ORAL AS NEEDED
COMMUNITY

## 2021-11-18 NOTE — PROGRESS NOTES
Identified pt with two pt identifiers(name and ). Chief Complaint   Patient presents with   [facility] Physical     has no acute concerns; would like physical today; had labs on  for BeWell screening which showed elevated lipid; had coffee with splenda this morning         3 most recent PHQ Screens 2021   Little interest or pleasure in doing things Not at all   Feeling down, depressed, irritable, or hopeless Not at all   Total Score PHQ 2 0        Vitals:    21 0806   BP: 103/64   Pulse: 67   Resp: 16   Temp: 97.8 °F (36.6 °C)   TempSrc: Temporal   SpO2: 98%   Weight: 135 lb (61.2 kg)   Height: 5' 5\" (1.651 m)   PainSc:   4   PainLoc: Back       Health Maintenance Due   Topic    Hepatitis C Screening     Lipid Screen     Colorectal Cancer Screening Combo     Breast Cancer Screen Mammogram        1. Have you been to the ER, urgent care clinic since your last visit? Hospitalized since your last visit? No    2. Have you seen or consulted any other health care providers outside of the [address] [place] [address] since your last visit? Include any pap smears or colon screening.  No

## 2021-11-18 NOTE — PROGRESS NOTES
HPI:  Alberto Case is a 64 y.o. female presenting for well woman exam.      No complaints or new concerns. 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 400-600 mg    psyllium (METAMUCIL) powd Take  by mouth.  valACYclovir (VALTREX) 500 mg tablet Take  by mouth as needed.  loratadine (Claritin) 10 mg tablet Take 10 mg by mouth.  zolpidem (AMBIEN) 5 mg tablet TK 1/2 TO 1 T PO QHS    cholecalciferol, vitamin D3, (VITAMIN D3) 2,000 unit tab Take 2,000 Units by mouth daily. No current facility-administered medications for this visit.          Past Medical History- reviewed:  Past Medical History:   Diagnosis Date    Breast cancer ([facility] [address] [address]     LEFT, DCIS    Cancer ([facility] [address]     left breast cancer    Degenerative lumbar disc     Fibroid uterus     Radiation therapy complication          Past Surgical History- reviewed:   Past Surgical History:   Procedure Laterality Date    HX BREAST LUMPECTOMY Left     HX GYN          [place] MASTECTOMY, PARTIAL           Family History - reviewed:  Family History   Problem Relation Age of Onset    Cancer Father         Lung    Cancer Other         father history of colon polyps    Breast Cancer Other     Breast Cancer Maternal Aunt          Social History - reviewed:  Social History Socioeconomic History    Marital status:      Spouse name: Not on file    Number of children: Not on file    Years of education: Not on file    Highest education level: Not on file   Occupational History    Not on file   Tobacco Use    Smoking status: Never Smoker    Smokeless tobacco: Never Used   Vaping Use    Vaping Use: Never used   Substance and Sexual Activity    Alcohol use: Yes     Comment: 1-2x/wk    Drug use: Never    Sexual activity: Yes     Partners: Male     Birth control/protection: Surgical     Comment: vasectomy   Other Topics Concern    Not on file   Social History Narrative    Not on file     Social Determinants of Health     Financial Resource Strain:     Difficulty of Paying Living Expenses: Not on file   Food Insecurity:     Worried About Running Out of Food in the Last Year: Not on file    Dajuan of Food in the Last Year: Not on file   Transportation Needs:     Lack of Transportation (Medical): Not on file    Lack of Transportation (Non-Medical):  Not on file   Physical Activity:     Days of Exercise per Week: Not on file    Minutes of Exercise per Session: Not on file   Stress:     Feeling of Stress : Not on file   Social Connections:     Frequency of Communication with Friends and Family: Not on file    Frequency of Social Gatherings with Friends and Family: Not on file    Attends Holiness Services: Not on file    Active Member of [address] [place] [address] or Organizations: Not on file    Attends Club or Organization Meetings: Not on file    Marital Status: Not on file   Intimate Partner Violence:     Fear of Current or Ex-Partner: Not on file    Emotionally Abused: Not on file    Physically Abused: Not on file    Sexually Abused: Not on file   Housing Stability:     Unable to Pay for Housing in the Last Year: Not on file    Number of [place] in the Last Year: Not on file    Unstable Housing in the Last Year: Not on file         Immunizations - reviewed: Immunization History   Administered Date(s) Administered    COVID-19, Moderna, Primary or Immunocompromised Series, MRNA, PF, 100mcg/0.5mL 01/07/2021, 02/04/2021    Influenza Vaccine 10/13/2013, 09/30/2014, 09/30/2015, 10/01/2016, 09/01/2017, 10/02/2019, 10/27/2021    Influenza Vaccine (Quad) Mdck Pf (>2 Yrs Flucelvax QUAD [id number]) 10/30/2020    Tdap 04/24/2015    Zoster Recombinant 09/11/2019, 06/10/2020     Flu: utd  Tdap: utd  Pneumovax: not indicated  Zostervax: utd    Health Maintenance reviewed -  Pap smear - Jan 21  Mammogram - Nov 20  Colonoscopy - Nov 15  DEXA scan - not due  HIV testing - not interested  Hepatitis C testing - wants to wait to review labs  Lung cancer screening - not indiciated    Review of Systems   No fever, chills, sore throat, cough, chest pain, shortness of breath     Physical Exam  Visit Vitals  /64 (BP 1 Location: Left upper arm, BP Patient Position: Sitting, BP Cuff Size: Small adult)   Pulse 67   Temp 97.8 °F (36.6 °C) (Temporal)   Resp 16   Ht 5' 5\" (1.651 m)   Wt 135 lb (61.2 kg)   SpO2 98%   BMI 22.47 kg/m²     General appearance - alert, well appearing, and in no distress  Eyes - sclera anicteric, left eye normal, right eye normal  Ears - bilateral TM's and external ear canals normal  Nose - wearing mask  Mouth - wearing mask  Neck - supple, no significant adenopathy  Chest - clear to auscultation, no wheezes, rales or rhonchi, symmetric air entry  Heart - normal rate, regular rhythm, normal S1, S2, no murmurs, rubs, clicks or gallops  Abdomen - soft, nontender, nondistended, no masses or organomegaly  Neurological - alert, oriented, normal speech, no focal findings or movement disorder noted  Musculoskeletal - full range of motion without pain  Extremities - no pedal edema noted  Skin - normal coloration and turgor, no rashes, no suspicious skin lesions noted  Pelvic - not examined  Breast - not examined    Assessment/Plan:    ICD-10-CM ICD-9-CM    1.  Well woman exam (no gynecological exam)  Z00.00 V70.0    2. Insomnia, unspecified type  G47.00 780.52 COMPLIANCE DRUG SCREEN/PRESCRIPTION MONITORING     Would like to investigate other treatment options she has tried for insomnia but will give UDS and sign controlled substance agreement in case she needs refill of Ambien. Discussed how I manage controlled substances in the office. I have discussed the diagnosis with the patient and the intended plan as seen in the above orders. Patient verbalized understanding of the plan and agrees with the plan. The patient has received an after-visit summary and questions were answered concerning future plans. I have discussed medication side effects and warnings with the patient as well. Informed patient to return to the office if new symptoms arise.     Christopher Valdez, DO

## 2021-11-18 NOTE — LETTER
Name:Kavita Woods   :1965   MR #:[id number]   Provider Name:Alyson Tai, DO   *[id number]*  [facility]-491 ()  Page 1 of 5 Initial Critical Diagnostics    CONTROLLED SUBSTANCE AGREEMENT    I may be prescribed medications that are controlled substances as part  of my treatment plan for management of my medical condition(s). The goal of my treatment plan is to maintain and/or improve my health and wellbeing. Because controlled substances have an increased risk of abuse or harm, continual re-evaluation is needed determine if the goals of my treatment plan are being met for my safety and the safety of others. I, Joby Jarrell  am entering into this Controlled Substance Agreement with my provider, Shiraz Chavez DO at [place] [address] . I understand that successful treatment requires mutual trust and honesty between me and my provider. I understand that there are state and federal laws and regulations which apply to the medications that my provider may prescribe that must be followed. I understand there are risks and benefits ts of taking the medicines that my provider may prescribe. I understand and agree that following this Agreement is necessary in continuing my provider-patient relationship and success of my treatment plan. As a part of my treatment plan, I agree to the following:    COMMUNICATION:    1. I will communicate fully with my provider about my medical condition(s), including the effect on my daily life and how well my medications are helping. I will tell my provider all of the medications that I take for any reason, including medications I receive from another health care provider, and will notify my provider about all issues, problems or concerns, including any side effects, which may be related to my medications. I understand that this information allows my provider to adjust my treatment plan to help manage my medical condition.  I understand that this information will become part of my permanent medical record. 2. I will notify my provider if I have a history of alcohol/drug misuse/addiction or if I have had treatment for alcohol/drug addiction in the past, or if I have a new problem with or concern about alcohol/drug use/addiction, because this increases the likelihood of high risk behaviors and may lead to serious medical conditions. 3. Females Only: I will notify my provider if I am or become pregnant, or if I intend to become pregnant, or if I intend to breastfeed. I understand that communication of these issues with my provider is important, due to possible effects my medication could have on an unborn fetus or breastfeeding child. Name:Kavita Guzman   PDQ:6/[id number]   MR #:[id number]   Provider Name:Juanita Tai DO   *[id number]*  [facility]-491 (5/16)  Page 2 of 5 Initial SMARTworks      MISUSE OF MEDICATIONS / DRUGS:    1. I agree to take all controlled substances as prescribed, and will not misuse or abuse any controlled substances prescribed by my provider. For my safety, I will not increase the amount of medicine I take without first talking with and getting permission from my provider. 2. If I have a medical emergency, another health care provider may prescribe me medication. If I seek emergency treatment, I will notify my provider within seventy-two (72) hours. 3. I understand that my provider may discuss my use and/or possible misuse/abuse of controlled substances and alcohol, as appropriate, with any health care provider involved in my care, pharmacist or legal authority. ILLEGAL DRUGS:    1. I will not use illegal drugs of any kind, including but not limited to marijuana, heroin, cocaine, or any prescription drug which is not prescribed to me. DRUG DIVERSION / PRESCRIPTION FRAUD:    1. I will not share, sell, trade, give away, or otherwise misuse my prescriptions or medications.     2. I will not alter any prescriptions provided to me by my provider. SINGLE PROVIDER:    1. I agree that all controlled substances that I take will be prescribed only by my provider (or his/her covering provider) under this Agreement. This agreement does not prevent me from seeking emergency medical treatment or receiving pain management related to a surgery. PROTECTING MEDICATIONS:    1. I am responsible for keeping my prescriptions and medications in a safe and secure place including safeguarding them from loss or theft. I understand that lost, stolen or damaged/destroyed prescriptions or medications will not be replaced. Name:Kavita Woods   :1965   MR #:[id number]   Provider Name:Susan Tai, DO   *[id number]*  [facility]-491 ()  Page 3 of 5 Initial vIPtela  PRESCRIPTION RENEWALS/REFILLS:    1. I will follow my controlled substance medication schedule as prescribed by my provider. 2. I understand and agree that I will make any requests for renewals or refills of my prescriptions only at the time of an office visit or during my providers regular office hours subject to the prescription refill requirements of the individual practice. 3. I understand that my provider may not call in prescriptions for controlled substances to my pharmacy. 4. I understand that my provider may adjust or discontinue these medications as deemed appropriate for my medical treatment plan. This Agreement does not guarantee the prescription of controlled medications. 5. I agree that if my medications are adjusted or discontinued, I will properly dispose of any remaining medications. I understand that I will be required to dispose of any remaining controlled medications prior to being provided with any prescriptions for other controlled medications. 6. I understand that the renewal of my prescription depends on my medical condition, my consistent participation, and my adherence with my treatment plan and this Agreement.     7. I understand that if I do not keep an appointment with my provider, I may not receive a renewal or refill for my controlled substance medication. PRESCRIPTION MONITORING / DRUG TESTIN. I understand that my provider may require me to provide urine, saliva or blood for testing at any time. I understand that this testing will be used to monitor for safety and adherence with my treatment plan and this Agreement. 2. I understand that my provider may ask me to provide an observed urine specimen, which means that a nurse or other health care provider may watch me provide urine, and I agree to cooperate if I am asked to provide an observed specimen. 3. I understand that if I do not provide urine, saliva or blood samples within two (2) hours of my providers request, or other timeframe decided by my provider, my treatment plan could be changed, or my prescriptions and medications may be changed or ended. 4. I understand that urine, saliva and blood test results will be a part of my permanent medical record. Name:Kavita Woods   IDX:   MR #:[id number]   Provider Name:Crista Tai    *[place]*  [facility]-491 ()  Page 4 of 5 Initial SMARTworks    5. I understand that my provider is required to obtain a copy of my State Prescription Monitoring Program () Report at any time in order to safely prescribe medications. 6. I will bring all of my prescribed controlled substance medications in their original bottles to all of my scheduled appointments. 7. I understand that my provider may ask me to come to the practice with all of my prescribed medications for a random pill count at any time. I agree to cooperate if I am asked to come in for a random pill count. I understand that if I do not arrive in the timeframe decided by my provider, my treatment plan could be changed, or my prescriptions and medications may be changed or ended.     COOPERATION WITH INVESTIGATIONS:    1. I authorize my provider and my pharmacy to cooperate fully with any local, state, or federal law enforcement agency in the investigation of any possible misuse, sale, or other diversion of my controlled substance prescriptions or medications. RISKS:    1. I understand that my level of consciousness may be affected from the use of controlled substances, and I understand that there are risks, benefits, effects and potential alternatives (including no treatment) to the medications that my provider has prescribed. 2. I understand that I may become drowsy, tired, dizzy, constipated, and sick to my stomach, or have changes in my mood or in my sleep while taking my medications. I have talked with my provider about these possible side effects, risks, benefits, and alternative treatments, and my provider has answered all of my questions. 3. I understand that I should not suddenly stop taking my medications without first speaking with my provider. I understand that if I suddenly stop taking my medications, I may experience nausea, vomiting, sweating,anxiety, sleeplessness, itching or other uncomfortable feelings. 4. I will not take my medications with alcohol of any kind, including beer, wine or liquor. I understand that drinking alcohol with my medications increases the chances of side effects, including breathing problems or even death. 5. I understand that if I have a history of alcoholism or other drug addiction I may be at increased risk of addiction to my medications. Signs of addiction might include craving, compulsive use, and continued use despite harm. Since addiction is a disease, I understand my provider may decide to change my medications and refer me to appropriate treatment services. I understand that this information would become part of my permanent medical record.     Name:Kavita Woods   :1965   MR #:[id number]   Provider Name:Basilio Tai DO   [id number]*  [facility]-491 ()  Page 5 of 5 Initial SMARTworks      6. Females only: Children born to women who regularly take controlled substances are likely to have physical problems and suffer withdrawal symptoms at birth. If I am of child-bearing age, I understand that I should use safe and effective birth control while taking any controlled substances to avoid the impact of medications on an unborn fetus or  child. I agree to notify my provider immediately if I should become pregnant so that my treatment plan can be adjusted. 7. Males only: I understand that chronic use of controlled substances has been associated with low testosterone levels in males which may affect my mood, stamina, sexual desire, and general health. I understand that my provider may order the appropriate laboratory test to determine my testosterone level,and I agree to this testing. ADHERENCE:    1. I understand that if I do not adhere to this Agreement in any way, my provider may change my prescriptions, stop prescribing controlled substances or end our provider-patient relationship. 2. If my provider decides to stop prescribing medication, or decides to end our provider-patient relationship,my provider may require that I taper my medications slowly. If necessary, my provider may also provide a prescription for other medications to treat my withdrawal symptoms. UNDERSTANDING THIS AGREEMENT:    I understand that my provider may adjust or stop my prescriptions for controlled substances based on my medical condition and my treatment plan. I understand that this Agreement does not guarantee that I will be prescribed medications or controlled substances. I understand that controlled substances may be just one part  of my treatment plan. My initial on each page and my signature below shows that I have read each page of this Agreement, I have had an opportunity to ask questions, and all of my questions have been answered to my satisfaction by my provider.     By signing below, I agree to comply with this Agreement, and I understand that if I do not follow the Agreements listed above, my provider may stop        _________________________________________  Date/Time 11/18/2021 9:02 AM                 (Patient Signature)

## 2021-11-18 NOTE — PATIENT INSTRUCTIONS
Zolpidem (By mouth)   Zolpidem Tartrate (zole-PI-dem TAR-trate)  Treats insomnia. Brand Name(s): Ambien, Ambien CR   There may be other brand names for this medicine. When This Medicine Should Not Be Used: This medicine is not right for everyone. Do not use it if you had an allergic reaction to zolpidem. How to Use This Medicine:   Tablet, Long Acting Tablet  · Take your medicine as directed. Your dose may need to be changed several times to find what works best for you. · This medicine is not for long-term use. · This medicine is usually taken just before bedtime, or when you are having trouble falling asleep. Do not take this medicine if you are not able to sleep or rest for 7 to 8 hours before you need to be active again. · Do not take this medicine with food or right after a meal because it may not work as well. · Do not take this medicine if you have drank alcohol the same evening. · Swallow the extended-release tablet whole. Do not crush, break, or chew it. · This medicine should come with a Medication Guide. Ask your pharmacist for a copy if you do not have one. · Use this medicine only when you cannot sleep. You do not need to take it on a schedule. · Store the medicine in a closed container at room temperature, away from heat, moisture, and direct light. Drugs and Foods to Avoid:   Ask your doctor or pharmacist before using any other medicine, including over-the-counter medicines, vitamins, and herbal products. · Some medicines can affect how zolpidem works. Tell your doctor if you are using chlorpromazine, fluoxetine, imipramine, ketoconazole, rifampin, sertraline, or Chuyita's wort. · Tell your doctor if you use anything else that makes you sleepy. Some examples are allergy medicine, narcotic pain medicine, and alcohol. · Do not drink alcohol while you are using this medicine.   Warnings While Using This Medicine:   · Tell your doctor if you are pregnant or breastfeeding, or if you have kidney disease, liver disease, lung disease or breathing problems (such as sleep apnea), or myasthenia gravis. Tell your doctor if you have a history of alcohol or drug addiction, depression, or mental health problems. · This medicine may make you dizzy or drowsy, especially first thing the next morning. Do not drive or do anything that could be dangerous until you know how this medicine affects you. · This medicine may cause unusual moods and behaviors. You may also do things while you are still asleep that you may not remember the next morning, such as driving. Tell your doctor right away if you learn that this has happened. Also tell your doctor if you have any thought or behavior changes (such as problems with gambling or increased sex drive) that concern you. · This medicine can be habit-forming. Do not use more than your prescribed dose. Call your doctor if you think your medicine is not working. · Call your doctor if you still have trouble sleeping after you take this medicine for 7 to 10 days. · Do not stop using this medicine suddenly. Your doctor will need to slowly decrease your dose before you stop it completely. · Keep all medicine out of the reach of children. Never share your medicine with anyone. Possible Side Effects While Using This Medicine:   Call your doctor right away if you notice any of these side effects:  · Allergic reaction: Itching or hives, swelling in your face or hands, swelling or tingling in your mouth or throat, chest tightness, trouble breathing  · Anxiety, depression, nervousness, unusual behavior, or thoughts of hurting yourself  · Memory loss  · Seeing, hearing, or feeling things that are not there  · Severe confusion, drowsiness, muscle weakness  · Trouble breathing  If you notice these less serious side effects, talk with your doctor:   · Daytime drowsiness  If you notice other side effects that you think are caused by this medicine, tell your doctor.    Call your doctor for medical advice about side effects. You may report side effects to FDA at 8-744-HKO-5476  © 2017 [facility] Information is for End User's use only and may not be sold, redistributed or otherwise used for commercial purposes. The above information is an  only. It is not intended as medical advice for individual conditions or treatments.  Talk to your doctor, nurse or pharmacist before following any medical regimen to see if it is safe and effective for you.

## 2021-11-27 LAB — DRUGS UR: NORMAL

## 2021-11-30 ENCOUNTER — HOSPITAL ENCOUNTER (OUTPATIENT)
Dept: MAMMOGRAPHY | Age: 56
Discharge: HOME | End: 2021-11-30
Attending: FAMILY MEDICINE
Payer: COMMERCIAL

## 2021-11-30 DIAGNOSIS — Z12.31 SCREENING MAMMOGRAM FOR HIGH-RISK PATIENT: ICD-10-CM

## 2021-11-30 PROCEDURE — 77063 BREAST TOMOSYNTHESIS BI: CPT

## 2022-01-20 DIAGNOSIS — G47.00 INSOMNIA, UNSPECIFIED TYPE: Primary | ICD-10-CM

## 2022-01-20 RX ORDER — ZOLPIDEM TARTRATE 5 MG/1
TABLET ORAL
Qty: 30 TABLET | Refills: 0 | Status: SHIPPED | OUTPATIENT
Start: 2022-01-20 | End: 2022-06-20 | Stop reason: SDUPTHER

## 2022-02-17 ENCOUNTER — OFFICE VISIT (OUTPATIENT)
Dept: OBGYN CLINIC | Age: 57
End: 2022-02-17
Payer: COMMERCIAL

## 2022-02-17 VITALS — WEIGHT: 139 LBS | DIASTOLIC BLOOD PRESSURE: 76 MMHG | BODY MASS INDEX: 23.13 KG/M2 | SYSTOLIC BLOOD PRESSURE: 134 MMHG

## 2022-02-17 DIAGNOSIS — N95.1 MENOPAUSAL SYNDROME: Primary | ICD-10-CM

## 2022-02-17 PROCEDURE — 99213 OFFICE O/P EST LOW 20 MIN: CPT | Performed by: OBSTETRICS & GYNECOLOGY

## 2022-02-17 RX ORDER — ESTRADIOL AND PROGESTERONE 1; 100 MG/1; MG/1
1 CAPSULE ORAL DAILY
Qty: 30 EACH | Refills: 12 | Status: SHIPPED | OUTPATIENT
Start: 2022-02-17

## 2022-02-17 NOTE — PATIENT INSTRUCTIONS
Learning About Menopause  What is menopause? Menopause is the point in your life when you permanently stop having menstrual periods. After 1 year of having no periods, you've reached menopause. In most cases, menopause happens around age 48. But everyone's body has its own time line. You may stop having periods in your mid-40s. Or you might have them well into your 50s. Menopause is a natural part of growing older. You don't need treatment for it unless your symptoms bother you. But it's a good idea to learn all you can about menopause. Knowing what to expect can help you stay as healthy as possible. What happens during menopause? · It starts with perimenopause. This is the process of change that leads up to menopause. Perimenopause can start as early as your late 35s or as late as your early 46s. How long it lasts varies. But it usually lasts from 2 to 8 years. · During this time, your hormone levels will go up and down unevenly (fluctuate). This causes changes in your periods and other symptoms. In time, estrogen and progesterone levels drop enough that the menstrual cycle stops. Going a full year without having a period is usually considered menopause. · Low estrogen levels after menopause speed bone loss. This increases your risk of osteoporosis. Also, your risk of heart disease increases after menopause. · It's normal to have thinner, drier skin after menopause. The vaginal lining and the lower urinary tract also thin. This can make it hard to have sex. It can also increase the risk of vaginal and urinary tract infections. What are the symptoms? Symptoms may include:  · Hot flashes. · Trouble sleeping. · Vaginal dryness. Symptoms related to mood and thinking may also happen around the time of menopause. These include:  · Mood swings or feeling grouchy, depressed, or worried. · Problems with remembering or thinking clearly. You may have only a few mild symptoms.  Or you might have severe symptoms that disrupt your sleep and daily life. Menopause caused by surgery, chemotherapy, or radiation therapy can cause symptoms to be more severe. A condition you already had, such as depression, anxiety, sleep problems, or irritability, can also make symptoms worse. Symptoms tend to last or get worse the first year or more after menopause. Over time, hormones even out at low levels. Many symptoms improve or go away. But sometimes symptoms don't go away. After menopause, you may get other symptoms. These include drying and thinning of the skin, and vaginal and urinary tract changes. How are menopause symptoms treated? If your symptoms are bothering you, there are lifestyle changes and treatments that can help. Lifestyle changes    · Choose heart-healthy foods such as vegetables, fruits, nuts, beans, fish, and whole grains. Limit foods that have a lot of salt, fat, and sugar. Be sure you get enough calcium and vitamin D to help your bones stay strong. Low-fat or nonfat dairy products are a great source of calcium.     · Get regular exercise. It can help you manage your weight, keep your heart and bones strong, and lift your mood.     · Limit caffeine, alcohol, and stress. These things may make symptoms worse. Limiting them may help you sleep better.     · If you smoke, stop. Quitting smoking can reduce hot flashes and long-term health risks. Medicines  If your symptoms bother you, talk with your doctor. You may want to try prescription medicines, such as:    · Birth control pills before menopause.     · Hormone therapy (HT).   · Antidepressants.     · Clonidine. This medicine is usually used to treat high blood pressure. All medicines for menopause symptoms have possible risks or side effects. And there's a very small chance of serious health problems from taking hormone therapy. Be sure to talk to your doctor about your possible health risks before you start a treatment for menopause symptoms.   Other treatments  You can try:    · Cognitive-behavorial therapy. This may help reduce hot flashes.     · Hypnosis. This may help reduce the number and severity of hot flashes.     · Breathing exercises. They may help reduce hot flashes and emotional symptoms.     · Soy. Some people feel that eating lots of soy helps even out their menopause symptoms.     · Yoga or biofeedback. They may help reduce stress. Follow-up care is a key part of your treatment and safety. Be sure to make and go to all appointments, and call your doctor if you are having problems. It's also a good idea to know your test results and keep a list of the medicines you take. Where can you learn more? Go to http://www.gray.com/  Enter H199 in the search box to learn more about \"Learning About Menopause. \"  Current as of: February 11, 2021               Content Version: 13.0  © 3557-9318 [employer]. Care instructions adapted under license by [employer] (which disclaims liability or warranty for this information). If you have questions about a medical condition or this instruction, always ask your healthcare professional. [address] any warranty or liability for your use of this information.

## 2022-02-17 NOTE — PROGRESS NOTES
García Cheng is a 62 y.o. female who presents to discuss hormone replacement options. In menopause for 2 years. She is having trouble with sleeping, low libido. Recent bone density scan showed an s-curve scoliosis. Breast cancer survivor; estrogen receptor negative. 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 Strongly desires ERT; Risks and benefits fully reviewed and all questions answered. Her relevant past medical history:   Past Medical History:   Diagnosis Date    Breast cancer ([facility] [address] 2009    LEFT, DCIS    Cancer ([facility] [address]     left breast cancer    Degenerative lumbar disc     Fibroid uterus     Radiation therapy complication         Past Surgical History:   Procedure Laterality Date    HX BREAST BIOPSY Left 2010    benign    HX BREAST LUMPECTOMY Left 2009    HX GYN          VA MASTECTOMY, PARTIAL       Social History     Occupational History    Not on file   Tobacco Use    Smoking status: Never Smoker    Smokeless tobacco: Never Used   Vaping Use    Vaping Use: Never used   Substance and Sexual Activity    Alcohol use: Yes     Comment: 1-2x/wk    Drug use: Never    Sexual activity: Yes     Partners: Male     Birth control/protection: Surgical     Comment: vasectomy     Family History   Problem Relation Age of Onset    Cancer Father         Lung    Cancer Other         father history of colon polyps    Breast Cancer Other     Breast Cancer Maternal Aunt        Allergies   Allergen Reactions    Penicillin G Other (comments)     Patient stated erythema,bruising     Prior to Admission medications    Medication Sig Start Date End Date Taking? Authorizing Provider   zolpidem (AMBIEN) 5 mg tablet TK  TO 1 T PO QHS 22   Ximena Grullon,    ibuprofen (MOTRIN) 400 mg tablet Take  by mouth every six (6) hours as needed for Pain. 400-600 mg    Provider, Historical   psyllium (METAMUCIL) powd Take  by mouth.     Provider, Historical   valACYclovir (VALTREX) 500 mg tablet Take  by mouth as needed. Provider, Historical   loratadine (Claritin) 10 mg tablet Take 10 mg by mouth. Provider, Historical   cholecalciferol, vitamin D3, (VITAMIN D3) 2,000 unit tab Take 2,000 Units by mouth daily. Provider, Historical        Review of Systems - History obtained from the patient  Constitutional: negative for weight loss, fever, night sweats  HEENT: negative for hearing loss, earache, congestion, snoring, sorethroat  CV: negative for chest pain, palpitations, edema  Resp: negative for cough, shortness of breath, wheezing  Breast: negative for breast lumps, nipple discharge, galactorrhea  GI: negative for change in bowel habits, abdominal pain, black or bloody stools  : negative for frequency, dysuria, hematuria  MSK: negative for back pain, joint pain, muscle pain  Skin: negative for itching, rash, hives  Neuro: negative for dizziness, headache, confusion, weakness  Psych: negative for anxiety, depression, change in mood  Heme/lymph: negative for bleeding, bruising, pallor      Objective:  Visit Vitals  /76   Wt 139 lb (63 kg)   BMI 23.13 kg/m²          PHYSICAL EXAMINATION    Constitutional  · Appearance: well-nourished, well developed, alert, in no acute distress    HENT  · Head and Face: appears normal    Skin  · General Inspection: no rash, no lesions identified    Neurologic/Psychiatric  · Mental Status:  · Orientation: grossly oriented to person, place and time  · Mood and Affect: mood normal, affect appropriate    Assessment:    Menopausal sxs; low BMI; significant scolisis  Hx breast DCIS; s/p left lumpectomy    Plan:   Script bijuva; Risks and benefits fully reviewed and all questions answered. Patient declines presence of chaperone during today's visit.

## 2022-02-23 RX ORDER — ESTRADIOL AND NORETHINDRONE ACETATE .5; .1 MG/1; MG/1
1 TABLET ORAL DAILY
Qty: 30 TABLET | Refills: 12 | Status: SHIPPED | OUTPATIENT
Start: 2022-02-23

## 2022-03-03 NOTE — PROGRESS NOTES
3/3 SD: Pt participating in therapy, tolerating well. Awaiting approval for IPR.    3/4 SD: Monitoring kidney function and other electrolytes, pain management, and blood pressure fluctuation from extreme high to extreme low. IPR denied by insurance - [id number] scheduled for today.  UPDATE: Pt accepted into inpatient rehab   PT DAILY TREATMENT NOTE 2-15 Patient Name: Gerry Sandoval 
Date:3/26/2019 : 1965 [x]  Patient  Verified Payor: Jeffrey Godinez / Plan: [employer] / Product Type: PPO / In time: 7:30 am  Out time: 8:15 am 
Total Treatment Time (min): 45 Visit #: 6 Treatment Area: Lower back pain [M54.5] SUBJECTIVE Pain Level (0-10 scale): 1-2 Any medication changes, allergies to medications, adverse drug reactions, diagnosis change, or new procedure performed?: [x] No    [] Yes (see summary sheet for update) Subjective functional status/changes:   [] No changes reported \"I was good until yesterday. I went walking and now it's sore. It doesn't feel like I'm quite as uneven. \"  Complains of left hip and LBP. OBJECTIVE Right ASIS low (possible right posterior innominate) FRS left L3/4 
 
25 min Therapeutic Exercise:  [x] See flow sheet : 
  
Rationale: increase ROM and increase strength to improve the patients ability to sit, twist, lie down. 15 min Manual Therapy: MET with right hamstring for possible right pelvic innominate MET for FRS left L3/4 Rationale: decrease pain, increase ROM and increase tissue extensibility  to improve the patients ability to sit, twist, lie down. With 
 [x] TE 
 [] TA 
 [] neuro 
 [] other: Patient Education: [x] Review HEP [] Progressed/Changed HEP based on:  
[] positioning   [] body mechanics   [] transfers   [] heat/ice application   
[] other:   
 
Other Objective/Functional Measures:   
 
Pain Level (0-10 scale) post treatment: 0 
 
ASSESSMENT/Changes in Function: Pelvic and lumbar rotation dysfunction noted. Better with manual therapy.  
Patient will continue to benefit from skilled PT services to modify and progress therapeutic interventions, address functional mobility deficits, address ROM deficits, address strength deficits, analyze and address soft tissue restrictions, analyze and cue movement patterns, analyze and modify body mechanics/ergonomics and assess and modify postural abnormalities to attain remaining goals. []  See Plan of Care 
[]  See progress note/recertification 
[]  See Discharge Summary Progress towards goals / Updated goals: 
Short Term Goals: 1. Moderate tenderness. Met 2. Be able to sit for 20 minutes without increased pain. Partially met. PLAN [x]  Upgrade activities as tolerated     [x]  Continue plan of care 
[]  Update interventions per flow sheet      
[]  Discharge due to:_ 
[x]  Continue weekly. Patient to continue with daily HEP. Beatriz Ardon V, PT 3/26/2019

## 2022-06-20 ENCOUNTER — OFFICE VISIT (OUTPATIENT)
Dept: PRIMARY CARE CLINIC | Age: 57
End: 2022-06-20
Payer: COMMERCIAL

## 2022-06-20 VITALS
SYSTOLIC BLOOD PRESSURE: 138 MMHG | HEIGHT: 65 IN | DIASTOLIC BLOOD PRESSURE: 77 MMHG | TEMPERATURE: 98.6 F | RESPIRATION RATE: 16 BRPM | BODY MASS INDEX: 22.82 KG/M2 | OXYGEN SATURATION: 98 % | WEIGHT: 137 LBS | HEART RATE: 60 BPM

## 2022-06-20 DIAGNOSIS — Z86.19 HISTORY OF COLD SORES: ICD-10-CM

## 2022-06-20 DIAGNOSIS — Z79.899 MEDICATION MANAGEMENT: ICD-10-CM

## 2022-06-20 DIAGNOSIS — Z13.820 SCREENING FOR OSTEOPOROSIS: ICD-10-CM

## 2022-06-20 DIAGNOSIS — Z82.62 FAMILY HISTORY OF OSTEOPOROSIS: ICD-10-CM

## 2022-06-20 DIAGNOSIS — Z79.890 LONG-TERM CURRENT USE OF THYROID HORMONE REPLACEMENT THERAPY: ICD-10-CM

## 2022-06-20 DIAGNOSIS — G47.00 INSOMNIA, UNSPECIFIED TYPE: Primary | ICD-10-CM

## 2022-06-20 DIAGNOSIS — J30.89 ENVIRONMENTAL AND SEASONAL ALLERGIES: ICD-10-CM

## 2022-06-20 PROCEDURE — 99214 OFFICE O/P EST MOD 30 MIN: CPT | Performed by: NURSE PRACTITIONER

## 2022-06-20 RX ORDER — ZOLPIDEM TARTRATE 5 MG/1
TABLET ORAL
Qty: 30 TABLET | Refills: 0 | Status: SHIPPED | OUTPATIENT
Start: 2022-06-20 | End: 2022-09-26 | Stop reason: SDUPTHER

## 2022-06-20 NOTE — LETTER
6/20/2022 9:48 AM    Ms. Sullivan [facility] [id number]    CONTROLLED SUBSTANCE MEDICATION AGREEMENT     Patient Name: Charissa Barnett  Patient YOB: 1965     I understand, that controlled substance medications may be used to help better manage my symptoms and to improve my ability to function at home, work and in social settings. However, I also understand that these medications do have risks, which have been discussed with me, including possible development of physical or psychological dependence. I understand that successful treatment requires mutual trust and honesty between me and my provider. I understand and agree that following this Medication Agreement is necessary in continuing my provider-patient relationship and the success of my treatment plan. Explanation from my Provider: Benefits and Goals of Controlled Substance Medications: There are two potential goals for your treatment: (1) decreased pain and suffering (2) improved daily life functions. There are many possible treatments for your chronic condition(s). Alternatives such as physical therapy, yoga, massage, home daily exercise, meditation, relaxation techniques, injections, chiropractic manipulations, surgery, cognitive therapy, hypnosis and many medications that are not habit-forming may be used. Use of controlled substance medications may be helpful, but they are unlikely to resolve all symptoms or restore all function. Explanation from my Provider: Risks of Controlled Substance Medications:  Opioid pain medications: These medications can lead to problems such as addiction/dependence, sedation, lightheadedness/dizziness, memory issues, falls, constipation, nausea, or vomiting. They may also impair the ability to drive or operate machinery. Additionally, these medications may lower testosterone levels, leading to loss of bone strength, stamina and sex drive.   They may cause problems with breathing, sleep apnea and reduced coughing, which is especially dangerous for patients with lung disease. Overdose or dangerous interactions with alcohol and other medications may occur, leading to death. Hyperalgesia may develop, which means patients receiving opioids for the treatment of pain may become more sensitive to certain painful stimuli, and in some cases, experience pain from ordinarily non-painful stimuli. Women between the ages of 14-53 who could become pregnant should carefully weigh the risks and benefits of opioids with their physicians, as these medications increase the risk of pregnancy complications, including miscarriage,  delivery and stillbirth. It is also possible for babies to be born addicted to opioids. Opioid dependence withdrawal symptoms may include; feelings of uneasiness, increased pain, irritability, belly pain, diarrhea, sweats and goose-flesh. Benzodiazepines and non-benzodiazepine sleep medications: These medications can lead to problems such as addiction/dependence, sedation, fatigue, lightheadedness, dizziness, incoordination, falls, depression, hallucinations, and impaired judgment, memory and concentration. The ability to drive and operate machinery may also be affected. Abnormal sleep-related behaviors have been reported, including sleepwalking, driving, making telephone calls, eating, or having sex while not fully awake. These medications can suppress breathing and worsen sleep apnea, particularly when combined with alcohol or other sedating medications, potentially leading to death. Dependence withdrawal symptoms may include tremors, anxiety, hallucinations and seizures. Initials:_______  Stimulants:  Common adverse effects include addiction/dependence, increased blood  pressure and heart rate, decreased appetite, nausea, involuntary weight loss, insomnia,  irritability, and headaches.   These risks may increase when these medications are combined with other stimulants, such as caffeine pills or energy drinks, certain weight loss supplements and oral decongestants. Dependence withdrawal symptoms may include depressed mood, loss of interest, suicidal thoughts, anxiety, fatigue, appetite changes and agitation. Testosterone replacement therapy:  Potential side effects include increased risk of stroke and heart attack, blood clots, increased blood pressure, increased cholesterol, enlarged prostate, sleep apnea, irritability/aggression and other mood disorders, and decreased fertility. I agree and understand that I and my prescriber have the following rights and responsibilities regarding my treatment plan:     1. MY RIGHTS:  To be informed of my treatment and medication plan. To be an active participant in my health and wellbeing. 2. MY RESPONSIBILITY AND UNDERSTANDING FOR USE OF MEDICATIONS   I will take medications at the dose and frequency as directed. For my safety, I will not increase or change how I take my medications without the recommendation of my healthcare provider.  I will actively participate in any program recommended by my provider which may improve function, including social, physical, psychological programs.  I will not take my medications with alcohol or other drugs not prescribed to me. I understand that drinking alcohol with my medications increases the chances of side effects, including reduced breathing rate and could lead to personal injury when operating machinery.  I understand that if I have a history of substance use disorders, including alcohol or other illicit drugs, that I may be at increased risk of addiction to my medications.  I agree to notify my provider immediately if I should become pregnant so that my treatment plan can be adjusted.    I agree and understand that I shall only receive controlled substance medications from the prescriber that signed this agreement unless there is written agreement among other prescribers of controlled substances outlining the responsibility of the medications being prescribed.  I understand that the if the controlled medication is not helping to achieve goals, the dosage may be tapered and no longer prescribed. 3. MY RESPONSIBILITY FOR COMMUNICATION / PRESCRIPTION RENEWALS   I agree that all controlled substance medications that I take will be prescribed only by my provider. If another healthcare provider prescribes me medication in an emergency, I will notify my provider within seventy-two (72) hours.  I will arrange for refills at the prescribed interval ONLY during regular office hours. I will not ask for refills earlier than agreed, after-hours, on holidays or weekends. Refills may take up to 72 hours for processing and prescriptions to reach the pharmacy.  I will inform my other health care providers that I am taking these medications and of the existence of this [id number]. In the event of an emergency, I will provide the same information to the emergency department prescribers. Initials:_______  [place] I will keep my provider updated on the pharmacy I am using for controlled medication prescription filling. 4. MY RESPONSIBILITY FOR PROTECTING MEDICATIONS   I will protect my prescriptions and medications. I understand that lost or misplaced prescriptions will not be replaced.  I will keep medications only for my own use and will not share them with others. I will keep all medications away from children.  I agree that if my medications are adjusted or discontinued, I will properly dispose of any remaining medications. I understand that I will be required to dispose of any remaining controlled medications as, directed by my prescriber, prior to being provided with any prescriptions for other controlled medications.   Medication drop box locations can be found at: [employer]    5. MY RESPONSIBILITY WITH ILLEGAL DRUGS    I will not use illegal or street drugs or another person's prescription medications not prescribed to me.  If there are identified addiction type symptoms, then referral to a program may be provided by my provider and I agree to follow through with this recommendation. 6. MY RESPONSIBILITY FOR COOPERATION WITH INVESTIGATIONS   I understand that my provider will comply with any applicable law and may discuss my use and/or possible misuse/abuse of controlled substances and alcohol, as appropriate, with any health care provider involved in my care, pharmacist, or legal authority.  I authorize my provider and pharmacy to cooperate fully with law enforcement agencies (as permitted by law) in the investigation of any possible misuse, sale, or other diversion of my controlled substances.  I agree to waive any applicable privilege or right of privacy or confidentiality with respect to these authorizations. 7. PROVIDERS RIGHT TO MONITOR FOR SAFETY: PRESCRIPTION MONITORING / DRUG TESTING   I consent to drug/toxicology screening and will submit to a drug screen upon my providers request to assure I am only taking the prescribed drugs for my safety monitoring. I understand that a drug screen is a laboratory test in which a sample of my urine, blood or saliva is checked to see what drugs I have been taking. This may entail an observed urine specimen, which means that a nurse or other health care provider may watch me provide urine, and I will cooperate if I am asked to provide an observed specimen.  I understand that my provider will check a copy of my State Prescription Monitoring Program () Report in order to safely prescribe medications.  Pill Counts: I consent to pill counts when requested.   I may be asked to bring all my prescribed controlled substance medications, in their original bottles, to all of my scheduled appointments. In addition, my provider may ask me to come to the practice at any time for a random pill count. Initials:_______    8. TERMINATION OF THIS AGREEMENT  For my safety, my prescriber has the right to stop prescribing controlled substance medications and may end this agreement.  Conditions that may result in termination of this agreement:  a. I do not show any improvement in pain, or my activity has not improved. b. I develop rapid tolerance or loss of improvement, as described in my treatment plan.  c. I develop significant side effects from the medication. d. My behavior is not consistent with the responsibilities outlined above, thereby causing safety concerns to continue prescribing controlled substance medications. e. I fail to follow the terms of this agreement. f. Other:____________________________       UNDERSTANDING THIS MEDICATION AGREEMENT:    I have read the above and have had all my questions answered. For chronic disease management, I know that my symptoms can be managed with many types of treatments. A chronic medication trial may be part of my treatment, but I must be an active participant in my care. Medication therapy is only one part of my symptom management plan. In some cases, there may be limited scientific evidence to support the chronic use of certain medications to improve symptoms and daily function. Furthermore, in certain circumstances, there may be scientific information that suggests that the use of chronic controlled substances may worsen my symptoms and increase my risk of unintentional death directly related to this medication therapy. I know that if my provider feels my risk from controlled medications is greater than my benefit, I will have my controlled substance medication(s) compassionately lowered or removed altogether.      I further agree to allow this office to contact my HIPAA contact if there are concerns about my safety and use of the controlled medications. I have agreed to use the prescribed controlled substance medications to me as instructed by my provider and as stated in this Medication Agreement. My initial on each page and my signature below shows that I have read each page and I have had the opportunity to ask questions with answers provided by my provider.     Patient Name (Printed): _____________________________________  Patient Signature:  ______________________   Date: _____________    Prescriber Name (Printed): ___________________________________  Prescriber Signature: _____________________  Date: _____________               Nicolasa Rosado NP

## 2022-06-20 NOTE — PROGRESS NOTES
Chief Complaint   Patient presents with    Medication Refill       Health Maintenance Due   Topic    Hepatitis C Screening     Lipid Screen     Colorectal Cancer Screening Combo         1. Have you been to the ER, urgent care clinic since your last visit? Hospitalized since your last visit? No    2. Have you seen or consulted any other health care providers outside of the [address] [place] [address] since your last visit? Include any pap smears or colon screening.  No    Visit Vitals  Wt 137 lb (62.1 kg)   BMI 22.80 kg/m²

## 2022-06-29 LAB — DRUGS UR: NORMAL

## 2022-06-29 NOTE — PROGRESS NOTES
Urine Drug Screen is appropriate considering that you had taken your last dose of Ambien one week prior to getting this.

## 2022-07-11 ENCOUNTER — HOSPITAL ENCOUNTER (OUTPATIENT)
Dept: MAMMOGRAPHY | Age: 57
Discharge: HOME | End: 2022-07-11
Attending: NURSE PRACTITIONER
Payer: COMMERCIAL

## 2022-07-11 DIAGNOSIS — Z79.890 LONG-TERM CURRENT USE OF THYROID HORMONE REPLACEMENT THERAPY: ICD-10-CM

## 2022-07-11 DIAGNOSIS — Z82.62 FAMILY HISTORY OF OSTEOPOROSIS: ICD-10-CM

## 2022-07-11 DIAGNOSIS — Z13.820 SCREENING FOR OSTEOPOROSIS: ICD-10-CM

## 2022-07-11 PROCEDURE — 77080 DXA BONE DENSITY AXIAL: CPT

## 2022-08-24 NOTE — PROGRESS NOTES
[facility]   [address], [address] [facility]  P: [phone]  F: [phone]    SUBJECTIVE     HPI:     Elizabeth Delgado is a 62 y.o. female who is seen in the clinic for   Chief Complaint   Patient presents with    Medication Refill      The patient presents today for a medication refill of her prn Ambien medication. She states that she takes 1/2 tablet 2-3 times per month. Last dose was administered a week ago. Her previous UDS was performed on  and did not show Ambien in system at that time. She had not taken the medication during that time. PDMP reviewed. Dr. Byron Tejeda refilled Ambien on 22. New Controlled Substance Agreement signed and placed in the patient's chart today. On , she was placed on Bijuvi to manage her \"sleeping issues\" and low libido by her OB/GYN (Dr. Violet Ramon). Denies any s/e of the medication. However, she has not noticed any improvement as well. She is requesting a Dexa Scan be performed. Has a strong family history of Osteoporosis. Allergies are well managed with Claritin. Takes Valtrex as needed for cold sores. Not currently active. There are no problems to display for this patient.        Past Medical History:   Diagnosis Date    Breast cancer ([address] 2009    LEFT, DCIS    Cancer ([address]     left breast cancer    Degenerative lumbar disc     Fibroid uterus     Radiation therapy complication      Past Surgical History:   Procedure Laterality Date    HX BREAST BIOPSY Left     benign    HX BREAST LUMPECTOMY Left     HX GYN          HI MASTECTOMY, PARTIAL       Social History     Socioeconomic History    Marital status:      Spouse name: Not on file    Number of children: Not on file    Years of education: Not on file    Highest education level: Not on file   Occupational History    Not on file   Tobacco Use    Smoking status: Never Smoker    Smokeless tobacco: Never Used Vaping Use    Vaping Use: Never used   Substance and Sexual Activity    Alcohol use: Yes     Comment: 1-2x/wk    Drug use: Never    Sexual activity: Yes     Partners: Male     Birth control/protection: Surgical     Comment: vasectomy   Other Topics Concern    Not on file   Social History Narrative    Not on file     Social Determinants of Health     Financial Resource Strain:     Difficulty of Paying Living Expenses: Not on file   Food Insecurity:     Worried About Running Out of Food in the Last Year: Not on file    Dajuan of Food in the Last Year: Not on file   Transportation Needs:     Lack of Transportation (Medical): Not on file    Lack of Transportation (Non-Medical):  Not on file   Physical Activity:     Days of Exercise per Week: Not on file    Minutes of Exercise per Session: Not on file   Stress:     Feeling of Stress : Not on file   Social Connections:     Frequency of Communication with Friends and Family: Not on file    Frequency of Social Gatherings with Friends and Family: Not on file    Attends Mandaen Services: Not on file    Active Member of [address] [place] [address] or Organizations: Not on file    Attends Club or Organization Meetings: Not on file    Marital Status: Not on file   Intimate Partner Violence:     Fear of Current or Ex-Partner: Not on file    Emotionally Abused: Not on file    Physically Abused: Not on file    Sexually Abused: Not on file   Housing Stability:     Unable to Pay for Housing in the Last Year: Not on file    Number of [place] in the Last Year: Not on file    Unstable Housing in the Last Year: Not on file     Family History   Problem Relation Age of Onset    Cancer Father         Lung    Cancer Other         father history of colon polyps    Breast Cancer Other     Breast Cancer Maternal Aunt      Immunization History   Administered Date(s) Administered    COVID-19, Moderna Booster, PF, 0.25mL Dose 12/18/2021    COVID-19, Erling Nipper, Primary or Immunocompromised Series, MRNA, PF, 100mcg/0.5mL 01/07/2021, 02/04/2021    Influenza Vaccine 10/13/2013, 09/30/2014, 09/30/2015, 10/01/2016, 09/01/2017, 10/02/2019, 10/27/2021    Influenza Vaccine (Quad) Mdck Pf (>2 Yrs Flucelvax QUAD 10010) 10/30/2020    Tdap 04/24/2015    Zoster Recombinant 09/11/2019, 06/10/2020      Allergies   Allergen Reactions    Penicillin G Other (comments)     Patient stated erythema,bruising       No visits with results within 3 Month(s) from this visit. Latest known visit with results is:   Orders Only on 11/18/2021   Component Date Value Ref Range Status    Summary 11/18/2021 Note   Final    Comment: (NOTE)  ====================================================================  Compliance Drug Analysis, Ur  ====================================================================  Test                             Result       Flag       Units  Drug Present   Acetaminophen                  PRESENT   Ibuprofen                      PRESENT  ====================================================================  Test                      Result    Flag   Units      Ref Range   Creatinine              72               mg/dL      >=20  ====================================================================  Declared Medications:  Medication list was not provided.  ====================================================================  For clinical consultation, please call [phone].  ====================================================================  Performed At: [facility]  [address] [id number]  Merry Jane PhD GR:[id number]        TRUDY 3D SUMAYA W MAMMO BI SCREENING INCL CAD  Narrative: STUDY: Bilateral digital screening mammogram with 3-D tomosynthesis    INDICATION:  Screening. COMPARISON: Prior studies dating back to 2012    BREAST COMPOSITION: The breasts are heterogeneously dense, which may obscure  small masses.     FINDINGS: Bilateral digital screening mammography was performed and is  interpreted in conjunction with a computer assisted detection (CAD) system. Additionally, tomosynthesis of both breasts in the CC and MLO projections was  performed. Lumpectomy changes are noted in the upper outer left breast. No  suspicious masses or calcifications are identified. There has been no  significant change. Impression: BI-RADS 2: Benign. No mammographic evidence of malignancy. RECOMMENDATIONS:  Next screening mammogram is recommended in one year. The patient will be notified of these results. Current Outpatient Medications   Medication Sig Dispense Refill    OTHER Vitamin B 6      zolpidem (AMBIEN) 5 mg tablet TK 1/2 TO 1 T PO QHS 30 Tablet 0    estradiol-norethindrone (Activella) 0.5-0.1 mg per tablet Take 1 Tablet by mouth daily. 30 Tablet 12    ibuprofen (MOTRIN) 400 mg tablet Take  by mouth every six (6) hours as needed for Pain. 400-600 mg      psyllium (METAMUCIL) powd Take  by mouth.  valACYclovir (VALTREX) 500 mg tablet Take  by mouth as needed.  loratadine (Claritin) 10 mg tablet Take 10 mg by mouth.  cholecalciferol, vitamin D3, (VITAMIN D3) 2,000 unit tab Take 2,000 Units by mouth daily.  estradiol-progesterone (Bijuva) 1-100 mg cap Take 1 Tablet by mouth daily. 30 Each 12           The past medical history, past surgical history, and family history were reviewed and updated in the medical record. Lab values/Imaging were reviewed. The medications were reviewed and updated in the medical record. Immunizations were reviewed and updated in the medical record. All relevant preventative screenings reviewed and updated in the medical record. REVIEW OF SYSTEMS   Review of Systems   Cardiovascular: Negative for chest pain and palpitations. Musculoskeletal: Negative for falls. Skin: Negative for itching and rash. Neurological: Negative for dizziness and headaches.    Endo/Heme/Allergies: Negative for environmental allergies. Psychiatric/Behavioral: Negative for depression, hallucinations, memory loss, substance abuse and suicidal ideas. The patient is not nervous/anxious and does not have insomnia. PHYSICAL EXAM   /77 (BP 1 Location: Right arm, BP Patient Position: Sitting, BP Cuff Size: Adult)   Pulse 60   Temp 98.6 °F (37 °C) (Temporal)   Resp 16   Ht 5' 5\" (1.651 m)   Wt 137 lb (62.1 kg)   SpO2 98%   BMI 22.80 kg/m²      Physical Exam  Constitutional:       General: She is not in acute distress. Appearance: She is not ill-appearing or diaphoretic. Cardiovascular:      Rate and Rhythm: Normal rate and regular rhythm. Pulses: Normal pulses. Heart sounds: Normal heart sounds. No murmur heard. Pulmonary:      Effort: Pulmonary effort is normal. No respiratory distress. Breath sounds: Normal breath sounds. No wheezing. Neurological:      General: No focal deficit present. Mental Status: She is alert. Cranial Nerves: No cranial nerve deficit. Motor: No weakness. Psychiatric:         Mood and Affect: Mood normal.         Behavior: Behavior normal.            ASSESSMENT/ PLAN   Below is the assessment and plan developed based on review of pertinent history, physical exam, labs, studies, and medications. 1. Insomnia, unspecified type  -     zolpidem (AMBIEN) 5 mg tablet; TK 1/2 TO 1 T PO QHS, Normal, Disp-30 Tablet, R-0  -     COMPLIANCE DRUG SCREEN/PRESCRIPTION MONITORING; Future  2. Environmental and seasonal allergies  Continue with current treatment regimen. 3. History of cold sores  Continue with current treatment regimen. 4. Long-term current use of thyroid hormone replacement therapy  Advised to follow up with Dr. Renuka Moran in regards to therapy options. -     DEXA BONE DENSITY STUDY AXIAL; Future  5. Medication management  Likely will not show Ambien in her system. -     COMPLIANCE DRUG SCREEN/PRESCRIPTION MONITORING; Future  6. Family history of osteoporosis  -     DEXA BONE DENSITY STUDY AXIAL; Future  7. Screening for osteoporosis  -     DEXA BONE DENSITY STUDY AXIAL; Future           Follow-up and Dispositions    · Return in about 6 months (around 12/20/2022) for Annual Physical Exam.           Disclaimer:  Advised patient to call back or return to office if symptoms worsen/change/persist.  Discussed expected course/resolution/complications of diagnosis in detail with patient. Medication risks/benefits/alternatives discussed with patient. Patient was given an after visit summary which includes diagnoses, current medications, & vitals. Discussed patient instructions and advised to read to all patient instructions regarding care. Patient expressed understanding with the diagnosis and plan.        Yolis Black NP  6/20/2022 Low Segment Transverse C/S

## 2022-08-30 LAB
CHOLEST SERPL-MCNC: 252 MG/DL
GLUCOSE SERPL-MCNC: 89 MG/DL (ref 65–100)
HDLC SERPL-MCNC: 74 MG/DL
LDLC SERPL CALC-MCNC: 164.6 MG/DL (ref 0–100)
TRIGL SERPL-MCNC: 67 MG/DL (ref ?–150)

## 2022-09-06 ENCOUNTER — TRANSCRIBE ORDER (OUTPATIENT)
Dept: SCHEDULING | Age: 57
End: 2022-09-06

## 2022-09-06 DIAGNOSIS — M54.16 LUMBAR RADICULITIS: Primary | ICD-10-CM

## 2022-09-13 ENCOUNTER — HOSPITAL ENCOUNTER (OUTPATIENT)
Dept: MRI IMAGING | Age: 57
Discharge: HOME | End: 2022-09-13
Attending: ORTHOPAEDIC SURGERY
Payer: COMMERCIAL

## 2022-09-13 DIAGNOSIS — M54.16 LUMBAR RADICULITIS: ICD-10-CM

## 2022-09-13 PROCEDURE — 72148 MRI LUMBAR SPINE W/O DYE: CPT

## 2022-09-26 DIAGNOSIS — G47.00 INSOMNIA, UNSPECIFIED TYPE: ICD-10-CM

## 2022-09-26 NOTE — TELEPHONE ENCOUNTER
Called and left message for patient to call me back regarding her medication refill. Dr. Denita Varghese willing to fill 30 pills to last patient until November appt patient will need to keep November appt if 30 pills will last her until then.

## 2022-09-26 NOTE — TELEPHONE ENCOUNTER
Patient returned call. [facility] with rx being sent to the pharmacy. No problem keeping appointment next month.  Today's appointment canceled

## 2022-09-28 RX ORDER — ZOLPIDEM TARTRATE 5 MG/1
TABLET ORAL
Qty: 30 TABLET | Refills: 0 | Status: SHIPPED | OUTPATIENT
Start: 2022-09-28

## 2022-10-17 ENCOUNTER — TRANSCRIBE ORDER (OUTPATIENT)
Dept: SCHEDULING | Age: 57
End: 2022-10-17

## 2022-10-17 DIAGNOSIS — Z12.31 VISIT FOR SCREENING MAMMOGRAM: Primary | ICD-10-CM

## 2022-11-28 ENCOUNTER — OFFICE VISIT (OUTPATIENT)
Dept: PRIMARY CARE CLINIC | Age: 57
End: 2022-11-28
Payer: COMMERCIAL

## 2022-11-28 VITALS
DIASTOLIC BLOOD PRESSURE: 62 MMHG | HEIGHT: 65 IN | RESPIRATION RATE: 18 BRPM | OXYGEN SATURATION: 97 % | SYSTOLIC BLOOD PRESSURE: 98 MMHG | HEART RATE: 63 BPM | BODY MASS INDEX: 22.33 KG/M2 | WEIGHT: 134 LBS

## 2022-11-28 DIAGNOSIS — Z11.59 NEED FOR HEPATITIS C SCREENING TEST: ICD-10-CM

## 2022-11-28 DIAGNOSIS — E55.9 VITAMIN D DEFICIENCY: ICD-10-CM

## 2022-11-28 DIAGNOSIS — Z00.00 WELL WOMAN EXAM (NO GYNECOLOGICAL EXAM): ICD-10-CM

## 2022-11-28 DIAGNOSIS — M54.42 CHRONIC BILATERAL LOW BACK PAIN WITH BILATERAL SCIATICA: Primary | ICD-10-CM

## 2022-11-28 DIAGNOSIS — M54.41 CHRONIC BILATERAL LOW BACK PAIN WITH BILATERAL SCIATICA: Primary | ICD-10-CM

## 2022-11-28 DIAGNOSIS — M85.80 OSTEOPENIA, UNSPECIFIED LOCATION: ICD-10-CM

## 2022-11-28 DIAGNOSIS — G89.29 CHRONIC BILATERAL LOW BACK PAIN WITH BILATERAL SCIATICA: Primary | ICD-10-CM

## 2022-11-28 DIAGNOSIS — E78.00 PURE HYPERCHOLESTEROLEMIA: ICD-10-CM

## 2022-11-28 DIAGNOSIS — G47.00 INSOMNIA, UNSPECIFIED TYPE: ICD-10-CM

## 2022-11-28 PROCEDURE — 99396 PREV VISIT EST AGE 40-64: CPT | Performed by: FAMILY MEDICINE

## 2022-11-28 PROCEDURE — 99214 OFFICE O/P EST MOD 30 MIN: CPT | Performed by: FAMILY MEDICINE

## 2022-11-28 RX ORDER — ZOLPIDEM TARTRATE 5 MG/1
TABLET ORAL
Qty: 30 TABLET | Refills: 2 | Status: SHIPPED | OUTPATIENT
Start: 2022-11-28

## 2022-11-28 NOTE — PROGRESS NOTES
Chief Complaint   Patient presents with    Complete Physical     Wants to discuss Ambien medication, and back issues and dexa scan. Identified pt with two pt identifiers(name and ). Chief Complaint   Patient presents with    Complete Physical     Wants to discuss Ambien medication, and back issues and dexa scan. 3 most recent PHQ Screens 2022   Little interest or pleasure in doing things Not at all   Feeling down, depressed, irritable, or hopeless Not at all   Total Score PHQ 2 0        Vitals:    22 0839   BP: 98/62   Pulse: 63   Resp: 18   SpO2: 97%   Weight: 134 lb (60.8 kg)   Height: 5' 5\" (1.651 m)   PainSc:   0 - No pain       Health Maintenance Due   Topic Date Due    Hepatitis C Screening  Never done    Colorectal Cancer Screening Combo  Never done    COVID-19 Vaccine (4 - Booster for Moderna series) 2022    Flu Vaccine (1) 2022    Breast Cancer Screen Mammogram  2022        1. Have you been to the ER, urgent care clinic since your last visit? Hospitalized since your last visit? No    2. Have you seen or consulted any other health care providers outside of the [address] [place] [address] since your last visit? Include any pap smears or colon screening. Yes - [facility] &     3. For patients aged 39-70: Has the patient had a colonoscopy / FIT/ Cologuard? Yes - no Care Gap present      If the patient is female:    4. For patients aged 41-77: Has the patient had a mammogram within the past 2 years? Scheduled Dec 1st      5. For patients aged 21-65: Has the patient had a pap smear?  Scheduled

## 2022-11-28 NOTE — PROGRESS NOTES
HPI:  Luanne Valero is a 62 y.o. female presenting for well woman exam.     Presents for routine fill of her Ambien. She tried Trazodone and she thinks Lunesta in the past with prior PCP. Previously was taking PRN and last Rx for 30 tabs lasted her 2 months. She has taken 1/2 tab of Ambien every night for the past 2 months now she states. Would be interested in trying something else perhaps in the future 3-6 months. She is followed by Dr. Opal Kelsey) and she started getting ESIs this fall again, previously >1 year since last LORELEI per patient. Last in September 2022. She is also doing dry needling for her back. Has tried Gabapentin before. No saddle anesthesia, no urinary incontinence. Does have sciatica sometimes in both legs that comes and goes. No recent trauma/ injury. Had MRI in September. Does not feel anything has grossly changed since then that would require further imaging. Diet: eats healthy, meat and fish  Exercise: limited due to back pain  Tobacco Use: never  Alcohol: socially  Sexually active: yes  Desires STD testing: no    Allergies- reviewed: Allergies   Allergen Reactions    Penicillin G Other (comments)     Patient stated erythema,bruising       Medications- reviewed:   Current Outpatient Medications   Medication Sig    zolpidem (AMBIEN) 5 mg tablet TK 1/2 TO 1 T PO QHS    OTHER Vitamin B 6    estradiol-norethindrone (Activella) 0.5-0.1 mg per tablet Take 1 Tablet by mouth daily. ibuprofen (MOTRIN) 400 mg tablet Take  by mouth every six (6) hours as needed for Pain. 400-600 mg    valACYclovir (VALTREX) 500 mg tablet Take  by mouth as needed. loratadine (CLARITIN) 10 mg tablet Take 10 mg by mouth. cholecalciferol, vitamin D3, 50 mcg (2,000 unit) tab Take 2,000 Units by mouth daily. psyllium (METAMUCIL) powd Take  by mouth. (Patient not taking: Reported on 11/28/2022)     No current facility-administered medications for this visit.          Past Medical History- reviewed:  Past Medical History:   Diagnosis Date    Breast cancer ([facility] 2009    LEFT, DCIS    Cancer ([facility]     left breast cancer    Degenerative lumbar disc     Fibroid uterus     Radiation therapy complication          Past Surgical History- reviewed:   Past Surgical History:   Procedure Laterality Date    HX BREAST BIOPSY Left 2010    benign    HX BREAST LUMPECTOMY Left 2009    HX GYN          DC MASTECTOMY, PARTIAL           Family History - reviewed:  Family History   Problem Relation Age of Onset    Cancer Father         Lung    Cancer Other         father history of colon polyps    Breast Cancer Other     Breast Cancer Maternal Aunt          Social History - reviewed:  Social History     Socioeconomic History    Marital status:      Spouse name: Not on file    Number of children: Not on file    Years of education: Not on file    Highest education level: Not on file   Occupational History    Not on file   Tobacco Use    Smoking status: Never    Smokeless tobacco: Never   Vaping Use    Vaping Use: Never used   Substance and Sexual Activity    Alcohol use: Yes     Comment: 1-2x/wk    Drug use: Never    Sexual activity: Yes     Partners: Male     Birth control/protection: Surgical     Comment: vasectomy   Other Topics Concern    Not on file   Social History Narrative    Not on file     Social Determinants of Health     Financial Resource Strain: Low Risk     Difficulty of Paying Living Expenses: Not hard at all   Food Insecurity: No Food Insecurity    Worried About Running Out of Food in the Last Year: Never true    Ran Out of Food in the Last Year: Never true   Transportation Needs: Not on file   Physical Activity: Not on file   Stress: Not on file   Social Connections: Not on file   Intimate Partner Violence: Not on file   Housing Stability: Not on file         Immunizations - reviewed:   Immunization History   Administered Date(s) Administered    COVID-19, MODERNA BLUE border, Primary or Immunocompromised, (age 18y+), IM, 100 mcg/0.5mL 01/07/2021, 02/04/2021    COVID-19, MODERNA Booster BLUE border, (age 18y+), IM, 50mcg/0.25mL 12/18/2021    Influenza Vaccine 10/13/2013, 09/30/2014, 09/30/2015, 10/01/2016, 09/01/2017, 10/02/2019, 10/27/2021    Influenza, FLUCELVAX, (age 10 mo+), MDCK, PF 10/30/2020    Tdap 04/24/2015    Zoster Recombinant 09/11/2019, 06/10/2020     Flu: UTD  Tdap: UTD  Pneumovax: NA  Zostervax: UTD    Health Maintenance reviewed -  Pap smear 2021  Mammogram scheduled 12/1/22  Colonoscopy Nov 2015, due in 2025  DEXA scan Juy 2022  Hepatitis C testing - due  Lung cancer screening NA  She is fasting for labs.      Review of Systems   Denies fever, chills, sore throat, cough, chest pain, shortness of breath, abd pain, dysuria, hematuria, breast masses, nipple discharge    Physical Exam  Visit Vitals  BP 98/62 (BP 1 Location: Left upper arm, BP Patient Position: Sitting, BP Cuff Size: Adult)   Pulse 63   Resp 18   Ht 5' 5\" (1.651 m)   Wt 134 lb (60.8 kg)   SpO2 97%   BMI 22.30 kg/m²       General appearance - alert, well appearing, and in no distress  Eyes - sclera anicteric, left eye normal, right eye normal  Ears - bilateral TM's and external ear canals normal  Nose - normal and patent, no erythema, discharge or polyps  Mouth - mucous membranes moist, pharynx normal without lesions  Neck - supple, no significant adenopathy  Chest - clear to auscultation, no wheezes, rales or rhonchi, symmetric air entry  Heart - normal rate, regular rhythm, normal S1, S2, no murmurs, rubs, clicks or gallops  Abdomen - soft, nontender, nondistended, no masses or organomegaly  Neurological - alert, oriented, normal speech, no focal findings or movement disorder noted  Musculoskeletal - full range of motion without pain, rib hump is present, SLR neg BL, strength 5/5 in lower ext BL, gross sensation intact  Extremities - no pedal edema noted  Skin - normal coloration and turgor, no rashes, no suspicious skin lesions noted  Pelvic - Not examined   Breast - Not examined     Assessment/Plan:    ICD-10-CM ICD-9-CM    1. Chronic bilateral low back pain with bilateral sciatica  M54.42 724.2 REFERRAL TO PAIN MANAGEMENT    M54.41 724.3     G89.29 338.29       2. Vitamin D deficiency  E55.9 268.9 VITAMIN D, 25 HYDROXY      3. Well woman exam (no gynecological exam)  I31.74 K62.4 METABOLIC PANEL, COMPREHENSIVE      CBC W/O DIFF      4. Pure hypercholesterolemia  E78.00 272.0 LIPID PANEL      5. Need for hepatitis C screening test  Z11.59 V73.89 HEPATITIS C AB      6. Osteopenia, unspecified location  M85.80 733.90 REFERRAL TO ENDOCRINOLOGY      7. Insomnia, unspecified type  G47.00 780.52 zolpidem (AMBIEN) 5 mg tablet        Controlled substance agreement on file. Compliance screen in June neg for Ambien but she takes infrequently.  appropriate. We discussed would need another compliance screen in future. She has concerns about cost of this test. Discussed she could call insurance to see coverage for this test. She is interested in second opinion for her back pain and to see if any other treatment options for her pain. Referred to Dr. Kurtis Ramos.     She is interested in seeing Endo for her osteopenia. Referral placed. Will check Vit D and calcium level today. I have discussed the diagnosis with the patient and the intended plan as seen in the above orders. Patient verbalized understanding of the plan and agrees with the plan. The patient has received an after-visit summary and questions were answered concerning future plans. I have discussed medication side effects and warnings with the patient as well. Informed patient to return to the office if new symptoms arise.     Rafael James,

## 2022-12-01 ENCOUNTER — HOSPITAL ENCOUNTER (OUTPATIENT)
Dept: MAMMOGRAPHY | Age: 57
Discharge: HOME | End: 2022-12-01
Attending: OBSTETRICS & GYNECOLOGY

## 2022-12-01 DIAGNOSIS — Z12.31 VISIT FOR SCREENING MAMMOGRAM: ICD-10-CM

## 2022-12-06 ENCOUNTER — HOSPITAL ENCOUNTER (OUTPATIENT)
Dept: MAMMOGRAPHY | Age: 57
Discharge: HOME | End: 2022-12-06
Attending: OBSTETRICS & GYNECOLOGY
Payer: COMMERCIAL

## 2022-12-06 PROCEDURE — 77063 BREAST TOMOSYNTHESIS BI: CPT

## 2023-01-16 ENCOUNTER — OFFICE VISIT (OUTPATIENT)
Dept: OBGYN CLINIC | Age: 58
End: 2023-01-16
Payer: COMMERCIAL

## 2023-01-16 VITALS — SYSTOLIC BLOOD PRESSURE: 125 MMHG | BODY MASS INDEX: 22.3 KG/M2 | WEIGHT: 134 LBS | DIASTOLIC BLOOD PRESSURE: 82 MMHG

## 2023-01-16 DIAGNOSIS — Z01.419 WELL WOMAN EXAM: Primary | ICD-10-CM

## 2023-01-16 PROCEDURE — 99396 PREV VISIT EST AGE 40-64: CPT | Performed by: OBSTETRICS & GYNECOLOGY

## 2023-01-16 RX ORDER — ESTRADIOL AND NORETHINDRONE ACETATE .5; .1 MG/1; MG/1
1 TABLET ORAL DAILY
Qty: 30 TABLET | Refills: 12 | Status: SHIPPED | OUTPATIENT
Start: 2023-01-16

## 2023-05-05 RX ORDER — ESTRADIOL AND NORETHINDRONE ACETATE .5; .1 MG/1; MG/1
TABLET ORAL
Qty: 28 TABLET | Refills: 11 | Status: SHIPPED | OUTPATIENT
Start: 2023-05-05

## 2023-07-25 ENCOUNTER — HOSPITAL ENCOUNTER (OUTPATIENT)
Facility: HOSPITAL | Age: 58
Discharge: HOME | End: 2023-07-28
Attending: NEUROLOGICAL SURGERY
Payer: COMMERCIAL

## 2023-07-25 DIAGNOSIS — M41.9 SCOLIOSIS OF THORACIC SPINE, UNSPECIFIED SCOLIOSIS TYPE: ICD-10-CM

## 2023-07-25 DIAGNOSIS — M41.9 SCOLIOSIS OF LUMBAR SPINE, UNSPECIFIED SCOLIOSIS TYPE: ICD-10-CM

## 2023-07-25 PROCEDURE — 72146 MRI CHEST SPINE W/O DYE: CPT

## 2023-07-25 PROCEDURE — 72148 MRI LUMBAR SPINE W/O DYE: CPT

## 2023-08-14 ENCOUNTER — TELEPHONE (OUTPATIENT)
Dept: PRIMARY CARE CLINIC | Facility: CLINIC | Age: 58
End: 2023-08-14

## 2023-08-14 ENCOUNTER — PATIENT MESSAGE (OUTPATIENT)
Dept: PRIMARY CARE CLINIC | Facility: CLINIC | Age: 58
End: 2023-08-14

## 2023-08-14 NOTE — TELEPHONE ENCOUNTER
Patient tested positive for COVID last night. Requested Paxlovid. Patient said this is the second time they called today, but I do not see a previous encounter. Palma Soliz.

## 2023-08-15 ENCOUNTER — TELEMEDICINE (OUTPATIENT)
Dept: PRIMARY CARE CLINIC | Facility: CLINIC | Age: 58
End: 2023-08-15
Payer: COMMERCIAL

## 2023-08-15 DIAGNOSIS — U07.1 COVID-19: Primary | ICD-10-CM

## 2023-08-15 PROCEDURE — 99213 OFFICE O/P EST LOW 20 MIN: CPT | Performed by: FAMILY MEDICINE

## 2023-08-15 RX ORDER — DOCUSATE SODIUM 100 MG/1
CAPSULE, LIQUID FILLED ORAL
COMMUNITY
Start: 2023-05-04

## 2023-08-15 RX ORDER — BENZONATATE 100 MG/1
100 CAPSULE ORAL 3 TIMES DAILY PRN
Qty: 15 CAPSULE | Refills: 0 | Status: SHIPPED | OUTPATIENT
Start: 2023-08-15

## 2023-08-15 RX ORDER — ACETAMINOPHEN 325 MG/1
650 TABLET ORAL EVERY 6 HOURS PRN
COMMUNITY

## 2023-08-15 RX ORDER — TIZANIDINE 4 MG/1
TABLET ORAL
COMMUNITY
Start: 2023-06-08

## 2023-08-15 RX ORDER — PYRIDOXINE HCL (VITAMIN B6) 100 MG
TABLET ORAL
COMMUNITY
Start: 2022-01-01

## 2023-08-15 RX ORDER — FLUTICASONE PROPIONATE 50 MCG
1 SPRAY, SUSPENSION (ML) NASAL DAILY
COMMUNITY

## 2023-08-15 SDOH — ECONOMIC STABILITY: TRANSPORTATION INSECURITY
IN THE PAST 12 MONTHS, HAS LACK OF TRANSPORTATION KEPT YOU FROM MEETINGS, WORK, OR FROM GETTING THINGS NEEDED FOR DAILY LIVING?: NO

## 2023-08-15 SDOH — ECONOMIC STABILITY: FOOD INSECURITY: WITHIN THE PAST 12 MONTHS, THE FOOD YOU BOUGHT JUST DIDN'T LAST AND YOU DIDN'T HAVE MONEY TO GET MORE.: NEVER TRUE

## 2023-08-15 SDOH — ECONOMIC STABILITY: INCOME INSECURITY: HOW HARD IS IT FOR YOU TO PAY FOR THE VERY BASICS LIKE FOOD, HOUSING, MEDICAL CARE, AND HEATING?: NOT HARD AT ALL

## 2023-08-15 SDOH — ECONOMIC STABILITY: FOOD INSECURITY: WITHIN THE PAST 12 MONTHS, YOU WORRIED THAT YOUR FOOD WOULD RUN OUT BEFORE YOU GOT MONEY TO BUY MORE.: NEVER TRUE

## 2023-08-15 SDOH — ECONOMIC STABILITY: HOUSING INSECURITY
IN THE LAST 12 MONTHS, WAS THERE A TIME WHEN YOU DID NOT HAVE A STEADY PLACE TO SLEEP OR SLEPT IN A SHELTER (INCLUDING NOW)?: NO

## 2023-08-15 ASSESSMENT — ENCOUNTER SYMPTOMS
SHORTNESS OF BREATH: 0
COUGH: 1

## 2023-08-15 NOTE — PROGRESS NOTES
Rafael Dudley is a 62 y.o. female    Chief Complaint   Patient presents with    Positive For Covid-19     Congestion, chest tightness and pain, headaches, low grade fever       There were no vitals taken for this visit. 1. Have you been to the ER, urgent care clinic since your last visit? Hospitalized since your last visit? No    2. Have you seen or consulted any other health care providers outside of the [address] [place] [address] since your last visit? Include any pap smears or colon screening.  No

## 2023-08-15 NOTE — PROGRESS NOTES
Mannie Bear (:  1965) is a Established patient, here for evaluation of the following:  Positive For Covid-19 (Congestion, chest tightness and pain, headaches, low grade fever)      Assessment & Plan   Below is the assessment and plan developed based on review of pertinent history, physical exam, labs, studies, and medications. 1. COVID-19  -     benzonatate (TESSALON) 100 MG capsule; Take 1 capsule by mouth 3 times daily as needed for Cough, Disp-15 capsule, R-0Normal    Does not meet current criteria per UTD for Paxlovid which she understands. Symptomatic care discussed. Given Tessalon for cough. Discussed I cannot assess chest pain or dyspnea virtually so if having these symptoms would recommend she be seen in person at urgent care/ ER. She states she will go if she has those symptoms again but they have since resolved. Quarantine precautions given based on most current CDC recommendations. ER precautions include chest pain, shortness of breath, lethargy, confusion, decreased PO intake/ UOP. Would recommend she monitor pulse ox at home. Follow up if symptoms are not improving. She voiced understanding. No follow-ups on file. Subjective   Started developing symptoms Saturday night. She had been to a large conference the week prior. She took a COVID test that night which was negative.  she took another COVID test that was positive. She has head congestion, cough and chest congestion. States it feels like someone is standing on her chest sometimes but not currently. States if this happens again she will get checked out. She has not been checking her pulse ox. Positive For Covid-19  Associated symptoms include congestion, coughing and headaches. Pertinent negatives include no chest pain. There is no problem list on file for this patient.        Current Outpatient Medications on File Prior to Visit   Medication Sig Dispense Refill    pyridoxine (B-6) 100 MG tablet       tiZANidine

## 2023-08-15 NOTE — TELEPHONE ENCOUNTER
From: Yadiel Llanos  To: Dr. Flako Villafana: 8/14/2023 5:18 PM EDT  Subject: Paxlovid    Hi. I am reaching out to request a prescription of Paxlovid. I tested positive for covid last night (Sunday, 8/13) at 7pm. I have called the office twice today to get the message to Dr Cristel Velez with no success. The first time was at 7:50am; I followed the prompt for a same day urgent appointment. I spoke with a female who took my information, said she would relay it to nursing, and they would call me shortly. The second time was at 2:03pm. I followed the prompts to get information to the clinical team and spoke to Kristin Bunch. He did not see the first telephone encounter and said he would get the information to Dr Cristel Velez. I see it in [facility] as an after visit summary and am concerned that would not elevate to her attention. I am happy to do an e-visit or virtual visit if needed. Please advise. I am feeling very poorly and would like some help.     Thank you,  Yadiel Llanos

## 2023-08-30 RX ORDER — VALACYCLOVIR HYDROCHLORIDE 500 MG/1
TABLET, FILM COATED ORAL PRN
Status: CANCELLED | OUTPATIENT
Start: 2023-08-30

## 2023-08-31 RX ORDER — VALACYCLOVIR HYDROCHLORIDE 1 G/1
TABLET, FILM COATED ORAL
Qty: 12 TABLET | Refills: 0 | Status: SHIPPED | OUTPATIENT
Start: 2023-08-31

## 2023-09-28 ENCOUNTER — TELEMEDICINE (OUTPATIENT)
Dept: PRIMARY CARE CLINIC | Facility: CLINIC | Age: 58
End: 2023-09-28
Payer: COMMERCIAL

## 2023-09-28 DIAGNOSIS — G47.00 INSOMNIA, UNSPECIFIED TYPE: Primary | ICD-10-CM

## 2023-09-28 PROCEDURE — 99214 OFFICE O/P EST MOD 30 MIN: CPT | Performed by: FAMILY MEDICINE

## 2023-09-28 RX ORDER — HYDROXYZINE HYDROCHLORIDE 25 MG/1
25 TABLET, FILM COATED ORAL NIGHTLY PRN
Qty: 30 TABLET | Refills: 2 | Status: SHIPPED | OUTPATIENT
Start: 2023-09-28

## 2023-09-28 NOTE — PROGRESS NOTES
Markus Lassiter (:  1965) is a Established patient, here for evaluation of the following:  Insomnia    Assessment & Plan   Below is the assessment and plan developed based on review of pertinent history, physical exam, labs, studies, and medications. 1. Insomnia, unspecified type  -     hydrOXYzine HCl (ATARAX) 25 MG tablet; Take 1 tablet by mouth nightly as needed (insomnia), Disp-30 tablet, R-2Normal    Chronic issue. Uncontrolled. Discussed this med can make her groggy if she takes too late. Discussed risk of prolonged qtc/ heart rhythm issues. Will give handout. Do not take with Tizanidine. Call back if symptoms fail to improve or worsen. No follow-ups on file. Subjective   Has tried multiple medications for insomnia including Gabpentin, Trazodone, Belsomra. Has been on Ambien for about 5 years now off. Has recently been using Tizanadine. Was given this for back pain but has continued to take it PRN insomnia. States she was on low dose Trazodone but states it made her feel physically uncomfortable. Felt she had severe brain fog. Insomnia  Associated symptoms include fatigue. Pertinent negatives include no fever. There is no problem list on file for this patient. Current Outpatient Medications on File Prior to Visit   Medication Sig Dispense Refill    valACYclovir (VALTREX) 1 g tablet Take 2 g BID x 1 day as needed for cold sore outbreak.  12 tablet 0    pyridoxine (B-6) 100 MG tablet       tiZANidine (ZANAFLEX) 4 MG tablet       docusate sodium (COLACE) 100 MG capsule       fluticasone (FLONASE) 50 MCG/ACT nasal spray 1 spray by Each Nostril route daily      acetaminophen (TYLENOL) 325 MG tablet Take 2 tablets by mouth every 6 hours as needed for Pain      Cholecalciferol 50 MCG ( UT) TABS Take 1 tablet by mouth daily      Estradiol-Norethindrone Acet 0.5-0.1 MG TABS Take 1 tablet by mouth daily      loratadine (CLARITIN) 10 MG tablet Take 1 tablet by mouth

## 2023-10-24 RX ORDER — TIZANIDINE 2 MG/1
2 TABLET ORAL NIGHTLY PRN
Qty: 30 TABLET | Refills: 2 | Status: SHIPPED | OUTPATIENT
Start: 2023-10-24

## 2023-10-24 RX ORDER — TIZANIDINE 2 MG/1
2 TABLET ORAL NIGHTLY PRN
Qty: 30 TABLET | Refills: 2 | Status: SHIPPED | OUTPATIENT
Start: 2023-10-24 | End: 2023-10-24 | Stop reason: SDUPTHER

## 2023-10-31 ENCOUNTER — TRANSCRIBE ORDERS (OUTPATIENT)
Facility: HOSPITAL | Age: 58
End: 2023-10-31

## 2023-10-31 DIAGNOSIS — Z12.31 SCREENING MAMMOGRAM FOR HIGH-RISK PATIENT: Primary | ICD-10-CM

## 2023-12-04 ENCOUNTER — OFFICE VISIT (OUTPATIENT)
Dept: PRIMARY CARE CLINIC | Facility: CLINIC | Age: 58
End: 2023-12-04
Payer: COMMERCIAL

## 2023-12-04 VITALS
BODY MASS INDEX: 22.16 KG/M2 | WEIGHT: 133 LBS | SYSTOLIC BLOOD PRESSURE: 111 MMHG | OXYGEN SATURATION: 95 % | HEIGHT: 65 IN | DIASTOLIC BLOOD PRESSURE: 68 MMHG | RESPIRATION RATE: 19 BRPM | HEART RATE: 70 BPM

## 2023-12-04 DIAGNOSIS — Z78.0 POST-MENOPAUSAL: ICD-10-CM

## 2023-12-04 DIAGNOSIS — M54.50 CHRONIC LOW BACK PAIN, UNSPECIFIED BACK PAIN LATERALITY, UNSPECIFIED WHETHER SCIATICA PRESENT: ICD-10-CM

## 2023-12-04 DIAGNOSIS — Z00.00 WELL WOMAN EXAM (NO GYNECOLOGICAL EXAM): ICD-10-CM

## 2023-12-04 DIAGNOSIS — G89.29 CHRONIC LOW BACK PAIN, UNSPECIFIED BACK PAIN LATERALITY, UNSPECIFIED WHETHER SCIATICA PRESENT: ICD-10-CM

## 2023-12-04 DIAGNOSIS — G47.00 INSOMNIA, UNSPECIFIED TYPE: Primary | ICD-10-CM

## 2023-12-04 DIAGNOSIS — E55.9 VITAMIN D DEFICIENCY: ICD-10-CM

## 2023-12-04 DIAGNOSIS — B35.1 ONYCHOMYCOSIS: ICD-10-CM

## 2023-12-04 PROCEDURE — 99396 PREV VISIT EST AGE 40-64: CPT | Performed by: FAMILY MEDICINE

## 2023-12-04 PROCEDURE — 99214 OFFICE O/P EST MOD 30 MIN: CPT | Performed by: FAMILY MEDICINE

## 2023-12-04 RX ORDER — HYDROXYZINE 50 MG/1
50 TABLET, FILM COATED ORAL
Qty: 30 TABLET | Refills: 0 | Status: SHIPPED | OUTPATIENT
Start: 2023-12-04

## 2023-12-04 ASSESSMENT — PATIENT HEALTH QUESTIONNAIRE - PHQ9
SUM OF ALL RESPONSES TO PHQ QUESTIONS 1-9: 0
1. LITTLE INTEREST OR PLEASURE IN DOING THINGS: 0
SUM OF ALL RESPONSES TO PHQ QUESTIONS 1-9: 0
2. FEELING DOWN, DEPRESSED OR HOPELESS: 0
SUM OF ALL RESPONSES TO PHQ QUESTIONS 1-9: 0
SUM OF ALL RESPONSES TO PHQ9 QUESTIONS 1 & 2: 0
SUM OF ALL RESPONSES TO PHQ QUESTIONS 1-9: 0

## 2023-12-12 ENCOUNTER — HOSPITAL ENCOUNTER (OUTPATIENT)
Facility: HOSPITAL | Age: 58
Discharge: HOME | End: 2023-12-15
Attending: OBSTETRICS & GYNECOLOGY
Payer: COMMERCIAL

## 2023-12-12 DIAGNOSIS — Z12.31 SCREENING MAMMOGRAM FOR HIGH-RISK PATIENT: ICD-10-CM

## 2023-12-12 DIAGNOSIS — E55.9 VITAMIN D DEFICIENCY: ICD-10-CM

## 2023-12-12 DIAGNOSIS — Z00.00 WELL WOMAN EXAM (NO GYNECOLOGICAL EXAM): ICD-10-CM

## 2023-12-12 LAB
25(OH)D3 SERPL-MCNC: 32.1 NG/ML (ref 30–100)
ALBUMIN SERPL-MCNC: 3.7 G/DL (ref 3.5–5)
ALBUMIN/GLOB SERPL: 1.3 (ref 1.1–2.2)
ALP SERPL-CCNC: 45 U/L (ref 45–117)
ALT SERPL-CCNC: 22 U/L (ref 12–78)
ANION GAP SERPL CALC-SCNC: 4 MMOL/L (ref 5–15)
AST SERPL-CCNC: 18 U/L (ref 15–37)
BILIRUB SERPL-MCNC: 0.5 MG/DL (ref 0.2–1)
BUN SERPL-MCNC: 16 MG/DL (ref 6–20)
BUN/CREAT SERPL: 24 (ref 12–20)
CALCIUM SERPL-MCNC: 8.5 MG/DL (ref 8.5–10.1)
CHLORIDE SERPL-SCNC: 109 MMOL/L (ref 97–108)
CHOLEST SERPL-MCNC: 236 MG/DL
CO2 SERPL-SCNC: 27 MMOL/L (ref 21–32)
CREAT SERPL-MCNC: 0.67 MG/DL (ref 0.55–1.02)
ERYTHROCYTE [DISTWIDTH] IN BLOOD BY AUTOMATED COUNT: 13.6 % (ref 11.5–14.5)
GLOBULIN SER CALC-MCNC: 2.9 G/DL (ref 2–4)
GLUCOSE SERPL-MCNC: 88 MG/DL (ref 65–100)
HCT VFR BLD AUTO: 36.8 % (ref 35–47)
HDLC SERPL-MCNC: 93 MG/DL
HDLC SERPL: 2.5 (ref 0–5)
HGB BLD-MCNC: 12.3 G/DL (ref 11.5–16)
LDLC SERPL CALC-MCNC: 124 MG/DL (ref 0–100)
MCH RBC QN AUTO: 30 PG (ref 26–34)
MCHC RBC AUTO-ENTMCNC: 33.4 G/DL (ref 30–36.5)
MCV RBC AUTO: 89.8 FL (ref 80–99)
NRBC # BLD: 0 K/UL (ref 0–0.01)
NRBC BLD-RTO: 0 PER 100 WBC
PLATELET # BLD AUTO: 199 K/UL (ref 150–400)
PMV BLD AUTO: 10.8 FL (ref 8.9–12.9)
POTASSIUM SERPL-SCNC: 3.8 MMOL/L (ref 3.5–5.1)
PROT SERPL-MCNC: 6.6 G/DL (ref 6.4–8.2)
RBC # BLD AUTO: 4.1 M/UL (ref 3.8–5.2)
SODIUM SERPL-SCNC: 140 MMOL/L (ref 136–145)
TRIGL SERPL-MCNC: 95 MG/DL
VLDLC SERPL CALC-MCNC: 19 MG/DL
WBC # BLD AUTO: 4.1 K/UL (ref 3.6–11)

## 2023-12-12 PROCEDURE — 77063 BREAST TOMOSYNTHESIS BI: CPT

## 2024-01-01 DIAGNOSIS — G47.00 INSOMNIA, UNSPECIFIED TYPE: ICD-10-CM

## 2024-01-01 DIAGNOSIS — B35.1 ONYCHOMYCOSIS: ICD-10-CM

## 2024-01-03 RX ORDER — HYDROXYZINE 50 MG/1
50 TABLET, FILM COATED ORAL
Qty: 30 TABLET | Refills: 2 | Status: SHIPPED | OUTPATIENT
Start: 2024-01-03

## 2024-01-16 RX ORDER — VALACYCLOVIR HYDROCHLORIDE 1 G/1
TABLET, FILM COATED ORAL
Qty: 12 TABLET | Refills: 2 | Status: SHIPPED | OUTPATIENT
Start: 2024-01-16

## 2024-01-19 ENCOUNTER — OFFICE VISIT (OUTPATIENT)
Age: 59
End: 2024-01-19
Payer: COMMERCIAL

## 2024-01-19 VITALS — SYSTOLIC BLOOD PRESSURE: 110 MMHG | WEIGHT: 139 LBS | BODY MASS INDEX: 23.13 KG/M2 | DIASTOLIC BLOOD PRESSURE: 68 MMHG

## 2024-01-19 DIAGNOSIS — Z01.419 WELL WOMAN EXAM: Primary | ICD-10-CM

## 2024-01-19 PROCEDURE — 99396 PREV VISIT EST AGE 40-64: CPT | Performed by: OBSTETRICS & GYNECOLOGY

## 2024-01-19 RX ORDER — ESTRADIOL AND NORETHINDRONE ACETATE .5; .1 MG/1; MG/1
1 TABLET ORAL DAILY
Qty: 28 TABLET | Refills: 12 | Status: SHIPPED | OUTPATIENT
Start: 2024-01-19

## 2024-01-19 NOTE — PROGRESS NOTES
joint pain, muscle pain  Breast: negative for breast lumps, nipple discharge, galactorrhea  Skin :negative for itching, rash, hives  Neuro: negative for dizziness, headache, confusion, weakness  Psych: negative for anxiety, depression, change in mood  Heme/lymph: negative for bleeding, bruising, pallor    Physical Exam    /68   Wt 63 kg (139 lb)   BMI 23.13 kg/m²     Constitutional  Appearance: well-nourished, well developed, alert, in no acute distress    HENT  Head and Face: appears normal    Chest  Respiratory Effort: breathing unlabored      Breasts  Inspection of Breasts: breasts symmetrical, no skin changes, no discharge present, nipple appearance normal, no skin retraction present  Palpation of Breasts and Axillae: fibrocystic change in lower outer quadrant with one 2-3cm dominant area  Axillary Lymph Nodes: no lymphadenopathy present    Gastrointestinal  Abdominal Examination: abdomen non-tender to palpation, normal bowel sounds, no masses present  Liver and spleen: no hepatomegaly present, spleen not palpable  Hernias: no hernias identified    Skin  General Inspection: no rash, no lesions identified    Neurologic/Psychiatric  Mental Status:  Orientation: grossly oriented to person, place and time  Mood and Affect: mood normal, affect appropriate    Genitourinary  External Genitalia: normal appearance for age, no discharge present, no tenderness present, no inflammatory lesions present, no masses present, no atrophy present  Vagina: normal vaginal vault without central or paravaginal defects, no discharge present, no inflammatory lesions present, no masses present  Bladder: non-tender to palpation  Urethra: appears normal  Cervix: normal   Uterus: normal size, shape and consistency  Adnexa: no adnexal tenderness present, no adnexal masses present  Perineum: perineum within normal limits, no evidence of trauma, no rashes or skin lesions present  Anus: anus within normal limits, no hemorrhoids

## 2024-01-22 LAB
., LABCORP: NORMAL
CYTOLOGIST CVX/VAG CYTO: NORMAL
CYTOLOGY CVX/VAG DOC CYTO: NORMAL
CYTOLOGY CVX/VAG DOC THIN PREP: NORMAL
DX ICD CODE: NORMAL
Lab: NORMAL
OTHER STN SPEC: NORMAL
STAT OF ADQ CVX/VAG CYTO-IMP: NORMAL

## 2024-01-24 ENCOUNTER — OFFICE VISIT (OUTPATIENT)
Age: 59
End: 2024-01-24
Payer: COMMERCIAL

## 2024-01-24 ENCOUNTER — TELEPHONE (OUTPATIENT)
Facility: HOSPITAL | Age: 59
End: 2024-01-24

## 2024-01-24 VITALS — HEIGHT: 65 IN | BODY MASS INDEX: 23.16 KG/M2 | WEIGHT: 139 LBS

## 2024-01-24 DIAGNOSIS — N63.23 MASS OF LOWER OUTER QUADRANT OF LEFT BREAST: ICD-10-CM

## 2024-01-24 DIAGNOSIS — Z91.89 AT HIGH RISK FOR BREAST CANCER: Primary | ICD-10-CM

## 2024-01-24 DIAGNOSIS — Z85.3 HISTORY OF LEFT BREAST CANCER: ICD-10-CM

## 2024-01-24 PROCEDURE — 99203 OFFICE O/P NEW LOW 30 MIN: CPT | Performed by: SURGERY

## 2024-01-24 PROCEDURE — 76642 ULTRASOUND BREAST LIMITED: CPT | Performed by: SURGERY

## 2024-01-24 NOTE — TELEPHONE ENCOUNTER
Received a call from central scheduling, they were scheduling Ms. Stovall's breast MRI and she takes HRT. I advised the patient that the radiologists policy is to have patients stop taking the HRT due to the effect on imaging, that it may make the images difficult to read and will lower the quality of imaging. I advised that she may end up with a biopsy that otherwise would not have been recommended. She expressed concerns coming off HRT as she takes it for her bone density. She will confer with Laquita Martinez and Duyen and call back to schedule when she has their recommendations.

## 2024-01-24 NOTE — PROGRESS NOTES
HISTORY OF PRESENT ILLNESS  Yesenia Stovall is a 59 y.o. female     HPI     NEW patient consult referred by  Dr. Martinez for left BREAST lump.      During office examination w/Dr. Martinez a lump was felt on the outer quadrant of LEFT breast where incision is.    Breast History  2009-LEFT breast DCIS  Dr. Fields-surgeon  Radiation ended May 2009    1/20/09: LEFT breast site-A, UOQ, core bx PATH: Focal DCIS, gr 2 with central necrosis. Extensive fibrocystic changes with sclerosing adenosis and associated   intraductal microcalcifications. ER-/MD-     2/25/09: LEFT breast excision PATH:   - LEFT breast, needle localization excision: DCIS, gr2, 1.5 cm, solid, papillary and cribriform. Caudal margin positive for in-situ carcinoma, microcalcifications present, Pathological stage: pTis, NX MX, ER-/MD-  - LEFt breast, new true medial, caudal, and lateral margin, excision: no invasive or in-situ carcinoma identified.        Family History:   no breast or ovarian cancer      Mammogram, 12/12/23, BIRADS 2    Mammogram Result (most recent):  [facility] MICHAEL DIGITAL SCREEN BILATERAL 12/12/2023    Narrative  STUDY: Bilateral digital screening mammogram with 3-D tomosynthesis    INDICATION:  Screening.    COMPARISON: Prior studies dating back to 2012    BREAST COMPOSITION: The breasts are heterogeneously dense, which may obscure  small masses.    FINDINGS: Bilateral digital screening mammography was performed and is  interpreted in conjunction with a computer assisted detection (CAD) system.  Additionally, tomosynthesis of both breasts in the CC and MLO projections was  performed. There is a chronic postsurgical scar in the upper outer left breast,  together with multiple left breast biopsy clips. No suspicious masses or  calcifications are identified. There has been no significant change.    Impression  BI-RADS 2: Benign. No mammographic evidence of malignancy.    RECOMMENDATIONS:  Next screening mammogram is recommended in one

## 2024-01-24 NOTE — PROGRESS NOTES
HISTORY OF PRESENT ILLNESS  Yesenia Stovall is a 59 y.o. female.    HPI  NEW patient consult referred by  Dr. Martinez for left BREAST lump.        During office examination w/Dr. Martinez a lump was felt on the outer quadrant of LEFT breast where incision is.     Breast History  2009-LEFT breast DCIS  Dr. Fields-surgeon  Radiation ended May 2009     1/20/09: LEFT breast site-A, UOQ, core bx PATH: Focal DCIS, gr 2 with central necrosis. Extensive fibrocystic changes with sclerosing adenosis and associated   intraductal microcalcifications. ER-/MS-     2/25/09: LEFT breast excision PATH:   - LEFT breast, needle localization excision: DCIS, gr2, 1.5 cm, solid, papillary and cribriform. Caudal margin positive for in-situ carcinoma, microcalcifications present, Pathological stage: pTis, NX MX, ER-/MS-  - LEFT breast, new true medial, caudal, and lateral margin, excision: no invasive or in-situ carcinoma identified.         Family History:   no breast or ovarian cancer        Mammogram, 12/12/23, BIRADS 2     Mammogram Result (most recent):  [facility] MICHAEL DIGITAL SCREEN BILATERAL 12/12/2023     Narrative  STUDY: Bilateral digital screening mammogram with 3-D tomosynthesis     INDICATION:  Screening.     COMPARISON: Prior studies dating back to 2012     BREAST COMPOSITION: The breasts are heterogeneously dense, which may obscure  small masses.     FINDINGS: Bilateral digital screening mammography was performed and is  interpreted in conjunction with a computer assisted detection (CAD) system.  Additionally, tomosynthesis of both breasts in the CC and MLO projections was  performed. There is a chronic postsurgical scar in the upper outer left breast,  together with multiple left breast biopsy clips. No suspicious masses or  calcifications are identified. There has been no significant change.     Impression  BI-RADS 2: Benign. No mammographic evidence of malignancy.     RECOMMENDATIONS:  Next screening mammogram is recommended in

## 2024-02-28 ENCOUNTER — TELEPHONE (OUTPATIENT)
Age: 59
End: 2024-02-28

## 2024-02-28 ENCOUNTER — HOSPITAL ENCOUNTER (OUTPATIENT)
Facility: HOSPITAL | Age: 59
Discharge: HOME | End: 2024-03-02
Attending: SURGERY
Payer: COMMERCIAL

## 2024-02-28 DIAGNOSIS — Z91.89 AT HIGH RISK FOR BREAST CANCER: ICD-10-CM

## 2024-02-28 DIAGNOSIS — N63.23 MASS OF LOWER OUTER QUADRANT OF LEFT BREAST: ICD-10-CM

## 2024-02-28 DIAGNOSIS — Z85.3 HISTORY OF LEFT BREAST CANCER: ICD-10-CM

## 2024-02-28 PROCEDURE — 6360000004 HC RX CONTRAST MEDICATION: Performed by: SURGERY

## 2024-02-28 PROCEDURE — C8908 MRI W/O FOL W/CONT, BREAST,: HCPCS

## 2024-02-28 PROCEDURE — A9585 GADOBUTROL INJECTION: HCPCS | Performed by: SURGERY

## 2024-02-28 RX ORDER — GADOBUTROL 604.72 MG/ML
6 INJECTION INTRAVENOUS
Status: COMPLETED | OUTPATIENT
Start: 2024-02-28 | End: 2024-02-28

## 2024-02-28 RX ADMIN — GADOBUTROL 6 ML: 604.72 INJECTION INTRAVENOUS at 09:51

## 2024-04-14 DIAGNOSIS — G47.00 INSOMNIA, UNSPECIFIED TYPE: ICD-10-CM

## 2024-04-15 NOTE — TELEPHONE ENCOUNTER
PCP: Stacia Soriano DO    Last Visit 12/4/2023   Future Appointments   Date Time Provider Department Center   4/18/2024  1:30 PM Anish Baker MD [facility]       Requested Prescriptions     Pending Prescriptions Disp Refills    hydrOXYzine HCl (ATARAX) 50 MG tablet [Pharmacy Med Name: HYDROXYZINE HCL 50MG TABS] 30 tablet 2     Sig: TAKE ONE TABLET BY MOUTH EVERY NIGHT AS NEEDED FOR ITCHING         Other Comments: Last Refill   01/13/24

## 2024-04-16 RX ORDER — HYDROXYZINE 50 MG/1
TABLET, FILM COATED ORAL
Qty: 30 TABLET | Refills: 1 | Status: SHIPPED | OUTPATIENT
Start: 2024-04-16

## 2024-04-18 ENCOUNTER — OFFICE VISIT (OUTPATIENT)
Dept: PRIMARY CARE CLINIC | Facility: CLINIC | Age: 59
End: 2024-04-18
Payer: COMMERCIAL

## 2024-04-18 VITALS
HEIGHT: 65 IN | BODY MASS INDEX: 22.82 KG/M2 | RESPIRATION RATE: 18 BRPM | TEMPERATURE: 97.8 F | HEART RATE: 66 BPM | DIASTOLIC BLOOD PRESSURE: 82 MMHG | SYSTOLIC BLOOD PRESSURE: 137 MMHG | OXYGEN SATURATION: 100 % | WEIGHT: 137 LBS

## 2024-04-18 DIAGNOSIS — B35.1 TINEA UNGUIUM: Primary | ICD-10-CM

## 2024-04-18 PROCEDURE — 99213 OFFICE O/P EST LOW 20 MIN: CPT | Performed by: INTERNAL MEDICINE

## 2024-04-18 ASSESSMENT — PATIENT HEALTH QUESTIONNAIRE - PHQ9
SUM OF ALL RESPONSES TO PHQ QUESTIONS 1-9: 0
SUM OF ALL RESPONSES TO PHQ QUESTIONS 1-9: 0
2. FEELING DOWN, DEPRESSED OR HOPELESS: NOT AT ALL
1. LITTLE INTEREST OR PLEASURE IN DOING THINGS: NOT AT ALL
SUM OF ALL RESPONSES TO PHQ QUESTIONS 1-9: 0
SUM OF ALL RESPONSES TO PHQ9 QUESTIONS 1 & 2: 0
SUM OF ALL RESPONSES TO PHQ QUESTIONS 1-9: 0

## 2024-04-19 NOTE — PROGRESS NOTES
\"Have you been to the ER, urgent care clinic since your last visit?  Hospitalized since your last visit?\"    NO    “Have you seen or consulted any other health care providers outside of [facility] since your last visit?”    NO            Click Here for Release of Records Request  
use of about 4.0 standard drinks of alcohol per week.            Review of Systems:   General: negative for - chills, fatigue, fever, weight change  Psych: negative for - anxiety, depression, irritability or mood swings  ENT: negative for - headaches, hearing change, nasal congestion, oral lesions, sneezing or sore throat  Heme/ Lymph: negative for - bleeding problems, bruising, pallor or swollen lymph nodes  Endo: negative for - hot flashes, polydipsia/polyuria or temperature intolerance  Resp: negative for - cough, shortness of breath or wheezing  CV: negative for - chest pain, edema or palpitations  GI: negative for - abdominal pain, change in bowel habits, constipation, diarrhea or nausea/vomiting  : negative for - dysuria, hematuria, incontinence, pelvic pain or vulvar/vaginal symptoms  MSK: negative for - joint pain, joint swelling or muscle pain  Neuro: negative for - confusion, headaches, seizures or weakness  Derm: negative for - dry skin, hair changes, rash or skin lesion changes          Physical:   Vitals:   Vitals:    04/18/24 1324   BP: 137/82   Pulse: 66   Resp: 18   Temp: 97.8 °F (36.6 °C)   TempSrc: Oral   SpO2: 100%   Weight: 62.1 kg (137 lb)   Height: 1.651 m (5' 5\")           Exam:   HEENT- atraumatic,normocephalic, awake, oriented, well nourished  Neck - supple,no enlarged lymph nodes, no JVD, no thyromegaly  Chest- CTA, no rhonchi, no crackles  Heart- rrr, no murmurs / gallop/rub  Abdomen- soft,BS+,NT, no hepatosplenomegaly  Ext - no c/c/edema , tinea unguium of the left great toenail  Neuro- no focal deficits.Power 5/5 all extremities  Skin - warm,dry, no obvious rashes.          Review of Data:   LABS:   Lab Results   Component Value Date/Time    WBC 4.1 12/12/2023 07:09 AM    HGB 12.3 12/12/2023 07:09 AM    HCT 36.8 12/12/2023 07:09 AM     12/12/2023 07:09 AM     Lab Results   Component Value Date/Time     12/12/2023 07:09 AM    K 3.8 12/12/2023 07:09 AM     12/12/2023

## 2024-06-23 DIAGNOSIS — G47.00 INSOMNIA, UNSPECIFIED TYPE: ICD-10-CM

## 2024-06-24 RX ORDER — HYDROXYZINE 50 MG/1
TABLET, FILM COATED ORAL
Qty: 90 TABLET | Refills: 1 | Status: SHIPPED | OUTPATIENT
Start: 2024-06-24

## 2024-06-24 RX ORDER — ESTRADIOL AND NORETHINDRONE ACETATE .5; .1 MG/1; MG/1
1 TABLET ORAL DAILY
Qty: 28 TABLET | Refills: 11 | OUTPATIENT
Start: 2024-06-24

## 2024-07-15 ENCOUNTER — HOSPITAL ENCOUNTER (OUTPATIENT)
Facility: HOSPITAL | Age: 59
Discharge: HOME | End: 2024-07-18
Payer: COMMERCIAL

## 2024-07-15 DIAGNOSIS — Z78.0 POST-MENOPAUSAL: ICD-10-CM

## 2024-07-15 PROCEDURE — 77080 DXA BONE DENSITY AXIAL: CPT

## 2024-07-24 ENCOUNTER — OFFICE VISIT (OUTPATIENT)
Dept: PRIMARY CARE CLINIC | Facility: CLINIC | Age: 59
End: 2024-07-24
Payer: COMMERCIAL

## 2024-07-24 VITALS
WEIGHT: 138 LBS | BODY MASS INDEX: 22.99 KG/M2 | DIASTOLIC BLOOD PRESSURE: 83 MMHG | HEIGHT: 65 IN | RESPIRATION RATE: 18 BRPM | SYSTOLIC BLOOD PRESSURE: 133 MMHG | OXYGEN SATURATION: 97 % | HEART RATE: 68 BPM

## 2024-07-24 DIAGNOSIS — H81.10 BENIGN PAROXYSMAL POSITIONAL VERTIGO, UNSPECIFIED LATERALITY: ICD-10-CM

## 2024-07-24 DIAGNOSIS — J01.10 ACUTE NON-RECURRENT FRONTAL SINUSITIS: Primary | ICD-10-CM

## 2024-07-24 PROCEDURE — 99213 OFFICE O/P EST LOW 20 MIN: CPT | Performed by: FAMILY MEDICINE

## 2024-07-24 RX ORDER — DOXYCYCLINE HYCLATE 100 MG
100 TABLET ORAL 2 TIMES DAILY
Qty: 14 TABLET | Refills: 0 | Status: SHIPPED | OUTPATIENT
Start: 2024-07-24 | End: 2024-07-31

## 2024-07-24 RX ORDER — PHENOL 1.4 %
1 AEROSOL, SPRAY (ML) MUCOUS MEMBRANE DAILY
COMMUNITY

## 2024-07-24 NOTE — PROGRESS NOTES
Health Decision Maker has been checked with the patient          AI form was signed    Chief Complaint   Patient presents with    Dizziness    Head Congestion     For 10 days.        \"Have you been to the ER, urgent care clinic since your last visit?  Hospitalized since your last visit?\"    NO    “Have you seen or consulted any other health care providers outside of [facility] since your last visit?”    NO      Vitals:    07/24/24 1532   Weight: 62.6 kg (138 lb)      Depression: Not at risk (4/18/2024)    PHQ-2     PHQ-2 Score: 0              Click Here for Release of Records Request        Chart reviewed: immunizations are documented.   Immunization History   Administered Date(s) Administered    COVID-19, MODERNA BLUE border, Primary or Immunocompromised, (age 12y+), IM, 100 mcg/0.5mL 01/07/2021, 02/04/2021    Influenza Trivalent 10/13/2013, 09/30/2014, 09/30/2015, 10/01/2016, 09/01/2017, 10/02/2019    Influenza Virus Vaccine 10/27/2021    Influenza, FLUCELVAX, (age 6 mo+), MDCK, PF, 0.5mL 10/30/2020    TDaP, ADACEL (age 10y-64y), BOOSTRIX (age 10y+), IM, 0.5mL 04/24/2015    Zoster Recombinant (Shingrix) 09/11/2019, 06/10/2020

## 2024-07-24 NOTE — PROGRESS NOTES
HPI     Chief Complaint   Patient presents with    Dizziness    Head Congestion     For 10 days.        History of Present Illness  The patient is a 59-year-old female who is coming in for head congestion.    The patient has been experiencing head congestion for the past 6 months, which she attributes to allergies. Over the past 10 days, she has experienced significant pressure, headaches, and a sensation of fullness in her left ear, which impedes her ability to pop it. On Sunday night she experienced a sensation of the room spinning, which she initially attributed to a natural reaction. However, upon awakening on Monday, the dizziness persisted. She attempted to alleviate the symptoms with meclizine on Monday and Tuesday, which provided short-term relief. She did not take any medication today. She has a history of vertigo, which occurred during her third pregnancy. She may have experienced 1 or 2 episodes in between, but this is rare. She regularly uses Flonase and Sudafed. She was taking hydroxyzine, but discontinued it when she started meclizine. The dizziness has been present since Sunday evening, and she reports a scratchy throat. She denies having a fever and has tested negative for COVID-19. She describes her dizziness as feeling off balance, looking the wrong way, or lifting objects off the ground. She and her  attempted to perform a seated Epley maneuver every 30 seconds, which exacerbated her symptoms. She did not experience symptoms on the right side, but when she stood up, she experienced a spinning sensation. She did not drive herself to the clinic. She denies having a tingling sensation. She reports mild sinus pressure. Her nasal discharge is clear but occasionally yellow. She has not undergone allergy testing.   She has a family history of allergies.   She is allergic to PENICILLIN.     Reviewed PmHx, RxHx, FmHx, SocHx, AllgHx and updated and dated in the chart.    Physical Exam:  BP

## 2024-07-29 DIAGNOSIS — R42 VERTIGO: Primary | ICD-10-CM

## 2024-07-29 DIAGNOSIS — H93.8X9 SENSATION OF FULLNESS IN EAR, UNSPECIFIED LATERALITY: ICD-10-CM

## 2024-09-03 RX ORDER — ESTRADIOL AND NORETHINDRONE ACETATE .5; .1 MG/1; MG/1
1 TABLET ORAL DAILY
Qty: 28 TABLET | Refills: 11 | OUTPATIENT
Start: 2024-09-03

## 2024-10-29 DIAGNOSIS — G47.00 INSOMNIA, UNSPECIFIED TYPE: ICD-10-CM

## 2024-10-30 RX ORDER — HYDROXYZINE HYDROCHLORIDE 50 MG/1
50 TABLET, FILM COATED ORAL NIGHTLY PRN
Qty: 90 TABLET | Refills: 0 | Status: SHIPPED | OUTPATIENT
Start: 2024-10-30

## 2024-12-17 ENCOUNTER — TRANSCRIBE ORDERS (OUTPATIENT)
Facility: HOSPITAL | Age: 59
End: 2024-12-17

## 2024-12-17 ENCOUNTER — HOSPITAL ENCOUNTER (OUTPATIENT)
Facility: HOSPITAL | Age: 59
Discharge: HOME | End: 2024-12-20
Payer: COMMERCIAL

## 2024-12-17 DIAGNOSIS — Z12.31 OTHER SCREENING MAMMOGRAM: Primary | ICD-10-CM

## 2024-12-17 DIAGNOSIS — Z12.31 OTHER SCREENING MAMMOGRAM: ICD-10-CM

## 2024-12-17 PROCEDURE — 77063 BREAST TOMOSYNTHESIS BI: CPT

## 2024-12-30 ENCOUNTER — OFFICE VISIT (OUTPATIENT)
Dept: PRIMARY CARE CLINIC | Facility: CLINIC | Age: 59
End: 2024-12-30
Payer: COMMERCIAL

## 2024-12-30 VITALS
TEMPERATURE: 97.8 F | DIASTOLIC BLOOD PRESSURE: 73 MMHG | BODY MASS INDEX: 22.96 KG/M2 | RESPIRATION RATE: 18 BRPM | SYSTOLIC BLOOD PRESSURE: 111 MMHG | HEART RATE: 67 BPM | HEIGHT: 65 IN | WEIGHT: 137.8 LBS | OXYGEN SATURATION: 100 %

## 2024-12-30 DIAGNOSIS — M41.9 SCOLIOSIS, UNSPECIFIED SCOLIOSIS TYPE, UNSPECIFIED SPINAL REGION: Primary | ICD-10-CM

## 2024-12-30 DIAGNOSIS — M54.50 CHRONIC LOW BACK PAIN, UNSPECIFIED BACK PAIN LATERALITY, UNSPECIFIED WHETHER SCIATICA PRESENT: ICD-10-CM

## 2024-12-30 DIAGNOSIS — Z01.84 IMMUNITY STATUS TESTING: ICD-10-CM

## 2024-12-30 DIAGNOSIS — G89.29 CHRONIC LOW BACK PAIN, UNSPECIFIED BACK PAIN LATERALITY, UNSPECIFIED WHETHER SCIATICA PRESENT: ICD-10-CM

## 2024-12-30 DIAGNOSIS — B00.1 RECURRENT COLD SORES: ICD-10-CM

## 2024-12-30 DIAGNOSIS — E55.9 VITAMIN D DEFICIENCY: ICD-10-CM

## 2024-12-30 DIAGNOSIS — Z00.00 WELL WOMAN EXAM (NO GYNECOLOGICAL EXAM): ICD-10-CM

## 2024-12-30 DIAGNOSIS — G47.00 INSOMNIA, UNSPECIFIED TYPE: ICD-10-CM

## 2024-12-30 DIAGNOSIS — Z12.11 SCREENING FOR COLON CANCER: ICD-10-CM

## 2024-12-30 PROCEDURE — 99396 PREV VISIT EST AGE 40-64: CPT | Performed by: FAMILY MEDICINE

## 2024-12-30 PROCEDURE — 99214 OFFICE O/P EST MOD 30 MIN: CPT | Performed by: FAMILY MEDICINE

## 2024-12-30 RX ORDER — VALACYCLOVIR HYDROCHLORIDE 1 G/1
TABLET, FILM COATED ORAL
Qty: 24 TABLET | Refills: 2 | Status: SHIPPED | OUTPATIENT
Start: 2024-12-30

## 2024-12-30 RX ORDER — TRAZODONE HYDROCHLORIDE 50 MG/1
TABLET, FILM COATED ORAL
Qty: 90 TABLET | Refills: 1 | Status: SHIPPED | OUTPATIENT
Start: 2024-12-30

## 2024-12-30 SDOH — ECONOMIC STABILITY: FOOD INSECURITY: WITHIN THE PAST 12 MONTHS, THE FOOD YOU BOUGHT JUST DIDN'T LAST AND YOU DIDN'T HAVE MONEY TO GET MORE.: NEVER TRUE

## 2024-12-30 SDOH — ECONOMIC STABILITY: FOOD INSECURITY: WITHIN THE PAST 12 MONTHS, YOU WORRIED THAT YOUR FOOD WOULD RUN OUT BEFORE YOU GOT MONEY TO BUY MORE.: NEVER TRUE

## 2024-12-30 SDOH — ECONOMIC STABILITY: INCOME INSECURITY: HOW HARD IS IT FOR YOU TO PAY FOR THE VERY BASICS LIKE FOOD, HOUSING, MEDICAL CARE, AND HEATING?: NOT HARD AT ALL

## 2025-01-19 DIAGNOSIS — G47.00 INSOMNIA, UNSPECIFIED TYPE: ICD-10-CM

## 2025-01-20 RX ORDER — TRAZODONE HYDROCHLORIDE 50 MG/1
TABLET, FILM COATED ORAL
Qty: 90 TABLET | Refills: 3 | Status: SHIPPED | OUTPATIENT
Start: 2025-01-20

## 2025-01-21 ENCOUNTER — OFFICE VISIT (OUTPATIENT)
Age: 60
End: 2025-01-21
Payer: COMMERCIAL

## 2025-01-21 VITALS
BODY MASS INDEX: 23.49 KG/M2 | HEIGHT: 65 IN | WEIGHT: 141 LBS | SYSTOLIC BLOOD PRESSURE: 109 MMHG | OXYGEN SATURATION: 98 % | DIASTOLIC BLOOD PRESSURE: 67 MMHG

## 2025-01-21 DIAGNOSIS — Z01.419 WELL WOMAN EXAM: Primary | ICD-10-CM

## 2025-01-21 PROCEDURE — 99396 PREV VISIT EST AGE 40-64: CPT | Performed by: OBSTETRICS & GYNECOLOGY

## 2025-01-21 NOTE — PROGRESS NOTES
Yesenia Stovall is a 60 y.o. female returns for an annual exam     Chief Complaint   Patient presents with    Annual Exam       Mammogram: Dec 2024    HRT: Mimvey 0.5 5 x per week    Colonoscopy:10 years ago, due now    Bone Density: done-osteopenia      1. Have you been to the ER, urgent care clinic, or hospitalized since your last visit? No    2. Have you seen or consulted any other health care providers outside of the [facility] since your last visit? No    Khloe Sánchez LPN.

## 2025-01-21 NOTE — PROGRESS NOTES
Annual exam    Here for annual with concerns noted in nursing note.   Had breast lumpectomy 10 plus years ago for DCIS.  Has had lengthy discussion regarding ERT.  Known osteopenia and challenges with back pain.  Strongly desires to remain on ERT  Past Medical History:   Diagnosis Date    Breast cancer (HCC) 2009    LEFT, DCIS    Cancer (HCC)     left breast cancer    Degenerative lumbar disc     Fibroid uterus     Herpes simplex virus (HSV) infection 1988    History of therapeutic radiation     left    Radiation therapy complication      Past Surgical History:   Procedure Laterality Date    BREAST BIOPSY Left     benign    BREAST LUMPECTOMY Left     (partial mast per Dr Cardenas)     SECTION  1998    GYN          MASTECTOMY  2009    partial    MASTECTOMY, PARTIAL         Current Outpatient Medications   Medication Sig Dispense Refill    traZODone (DESYREL) 50 MG tablet Take 1 tab PO qhs PRN insomnia. 90 tablet 3    valACYclovir (VALTREX) 1 g tablet Take 2 g BID x 1 day as needed for cold sore outbreak. 24 tablet 2    Estradiol-Norethindrone Acet 0.5-0.1 MG TABS Take 1 tablet by mouth daily 28 tablet 12    tiZANidine (ZANAFLEX) 2 MG tablet Take 1 tablet by mouth nightly as needed (muscle pain) 30 tablet 2    pyridoxine (B-6) 100 MG tablet       docusate sodium (COLACE) 100 MG capsule       fluticasone (FLONASE) 50 MCG/ACT nasal spray 1 spray by Each Nostril route daily       No current facility-administered medications for this visit.     Allergies: Penicillin g     Tobacco History:  reports that she has never smoked. She has never used smokeless tobacco.  Alcohol Abuse:  reports current alcohol use of about 4.0 standard drinks of alcohol per week.  Drug Abuse:  reports no history of drug use.    Family Medical/Cancer History:   Family History   Problem Relation Age of Onset    Breast Cancer Maternal Aunt     Cancer Other         father history of colon polyps    Breast Cancer Other

## 2025-01-29 RX ORDER — ESTRADIOL AND NORETHINDRONE ACETATE .5; .1 MG/1; MG/1
1 TABLET ORAL DAILY
Qty: 28 TABLET | Refills: 12 | Status: SHIPPED | OUTPATIENT
Start: 2025-01-29

## 2025-02-01 LAB
., LABCORP: NORMAL
CYTOLOGIST CVX/VAG CYTO: NORMAL
CYTOLOGY CVX/VAG DOC CYTO: NORMAL
CYTOLOGY CVX/VAG DOC THIN PREP: NORMAL
DX ICD CODE: NORMAL
Lab: NORMAL
Lab: NORMAL
OTHER STN SPEC: NORMAL
STAT OF ADQ CVX/VAG CYTO-IMP: NORMAL

## 2025-02-25 DIAGNOSIS — E55.9 VITAMIN D DEFICIENCY: ICD-10-CM

## 2025-02-25 DIAGNOSIS — Z01.84 IMMUNITY STATUS TESTING: ICD-10-CM

## 2025-02-25 DIAGNOSIS — Z00.00 WELL WOMAN EXAM (NO GYNECOLOGICAL EXAM): ICD-10-CM

## 2025-02-26 LAB
25(OH)D3 SERPL-MCNC: 35.7 NG/ML (ref 30–100)
ALBUMIN SERPL-MCNC: 3.6 G/DL (ref 3.5–5)
ALBUMIN/GLOB SERPL: 1.1 (ref 1.1–2.2)
ALP SERPL-CCNC: 59 U/L (ref 45–117)
ALT SERPL-CCNC: 20 U/L (ref 12–78)
ANION GAP SERPL CALC-SCNC: 5 MMOL/L (ref 2–12)
AST SERPL-CCNC: 23 U/L (ref 15–37)
BILIRUB SERPL-MCNC: 0.4 MG/DL (ref 0.2–1)
BUN SERPL-MCNC: 11 MG/DL (ref 6–20)
BUN/CREAT SERPL: 15 (ref 12–20)
CALCIUM SERPL-MCNC: 9.3 MG/DL (ref 8.5–10.1)
CHLORIDE SERPL-SCNC: 107 MMOL/L (ref 97–108)
CHOLEST SERPL-MCNC: 217 MG/DL
CO2 SERPL-SCNC: 28 MMOL/L (ref 21–32)
CREAT SERPL-MCNC: 0.72 MG/DL (ref 0.55–1.02)
ERYTHROCYTE [DISTWIDTH] IN BLOOD BY AUTOMATED COUNT: 14.1 % (ref 11.5–14.5)
GLOBULIN SER CALC-MCNC: 3.2 G/DL (ref 2–4)
GLUCOSE SERPL-MCNC: 91 MG/DL (ref 65–100)
HBV SURFACE AB SER QL: NONREACTIVE
HBV SURFACE AB SER-ACNC: 5.29 MIU/ML
HCT VFR BLD AUTO: 35.7 % (ref 35–47)
HDLC SERPL-MCNC: 90 MG/DL
HDLC SERPL: 2.4 (ref 0–5)
HGB BLD-MCNC: 11.6 G/DL (ref 11.5–16)
LDLC SERPL CALC-MCNC: 116 MG/DL (ref 0–100)
MCH RBC QN AUTO: 28.9 PG (ref 26–34)
MCHC RBC AUTO-ENTMCNC: 32.5 G/DL (ref 30–36.5)
MCV RBC AUTO: 88.8 FL (ref 80–99)
NRBC # BLD: 0 K/UL (ref 0–0.01)
NRBC BLD-RTO: 0 PER 100 WBC
PLATELET # BLD AUTO: 237 K/UL (ref 150–400)
PMV BLD AUTO: 11.1 FL (ref 8.9–12.9)
POTASSIUM SERPL-SCNC: 4.2 MMOL/L (ref 3.5–5.1)
PROT SERPL-MCNC: 6.8 G/DL (ref 6.4–8.2)
RBC # BLD AUTO: 4.02 M/UL (ref 3.8–5.2)
SODIUM SERPL-SCNC: 140 MMOL/L (ref 136–145)
TRIGL SERPL-MCNC: 55 MG/DL
VLDLC SERPL CALC-MCNC: 11 MG/DL
WBC # BLD AUTO: 4.4 K/UL (ref 3.6–11)

## 2025-05-05 LAB
CHOLEST SERPL-MCNC: 230 MG/DL
GLUCOSE SERPL-MCNC: 95 MG/DL (ref 65–100)
HDLC SERPL-MCNC: 103 MG/DL
LDLC SERPL CALC-MCNC: 115 MG/DL (ref 0–100)
TRIGL SERPL-MCNC: 60 MG/DL

## 2025-06-07 SDOH — ECONOMIC STABILITY: FOOD INSECURITY: WITHIN THE PAST 12 MONTHS, THE FOOD YOU BOUGHT JUST DIDN'T LAST AND YOU DIDN'T HAVE MONEY TO GET MORE.: NEVER TRUE

## 2025-06-07 SDOH — ECONOMIC STABILITY: FOOD INSECURITY: WITHIN THE PAST 12 MONTHS, YOU WORRIED THAT YOUR FOOD WOULD RUN OUT BEFORE YOU GOT MONEY TO BUY MORE.: NEVER TRUE

## 2025-06-07 SDOH — ECONOMIC STABILITY: INCOME INSECURITY: IN THE LAST 12 MONTHS, WAS THERE A TIME WHEN YOU WERE NOT ABLE TO PAY THE MORTGAGE OR RENT ON TIME?: NO

## 2025-06-07 SDOH — ECONOMIC STABILITY: TRANSPORTATION INSECURITY
IN THE PAST 12 MONTHS, HAS THE LACK OF TRANSPORTATION KEPT YOU FROM MEDICAL APPOINTMENTS OR FROM GETTING MEDICATIONS?: NO

## 2025-06-09 ENCOUNTER — TELEPHONE (OUTPATIENT)
Dept: PRIMARY CARE CLINIC | Facility: CLINIC | Age: 60
End: 2025-06-09

## 2025-06-09 ENCOUNTER — OFFICE VISIT (OUTPATIENT)
Dept: PRIMARY CARE CLINIC | Facility: CLINIC | Age: 60
End: 2025-06-09
Payer: COMMERCIAL

## 2025-06-09 VITALS
SYSTOLIC BLOOD PRESSURE: 113 MMHG | HEART RATE: 65 BPM | RESPIRATION RATE: 18 BRPM | BODY MASS INDEX: 23.82 KG/M2 | TEMPERATURE: 97.8 F | WEIGHT: 143 LBS | OXYGEN SATURATION: 99 % | DIASTOLIC BLOOD PRESSURE: 74 MMHG | HEIGHT: 65 IN

## 2025-06-09 DIAGNOSIS — M41.9 SCOLIOSIS, UNSPECIFIED SCOLIOSIS TYPE, UNSPECIFIED SPINAL REGION: ICD-10-CM

## 2025-06-09 DIAGNOSIS — G47.00 INSOMNIA, UNSPECIFIED TYPE: Primary | ICD-10-CM

## 2025-06-09 PROCEDURE — 99214 OFFICE O/P EST MOD 30 MIN: CPT | Performed by: FAMILY MEDICINE

## 2025-06-09 RX ORDER — TIZANIDINE 2 MG/1
2 TABLET ORAL NIGHTLY PRN
Qty: 90 TABLET | Refills: 0 | Status: SHIPPED | OUTPATIENT
Start: 2025-06-09

## 2025-06-09 RX ORDER — ACETAMINOPHEN 160 MG
2000 TABLET,DISINTEGRATING ORAL DAILY
COMMUNITY

## 2025-06-09 ASSESSMENT — PATIENT HEALTH QUESTIONNAIRE - PHQ9
2. FEELING DOWN, DEPRESSED OR HOPELESS: NOT AT ALL
SUM OF ALL RESPONSES TO PHQ QUESTIONS 1-9: 0
1. LITTLE INTEREST OR PLEASURE IN DOING THINGS: NOT AT ALL
SUM OF ALL RESPONSES TO PHQ QUESTIONS 1-9: 0

## 2025-06-09 NOTE — TELEPHONE ENCOUNTER
Called patient to reschedule appointment for tomorrow due to scheduling conflict. Was going to offer appt today at [facility] in person or virtual.     Will also send Follicumhart message

## 2025-06-09 NOTE — PROGRESS NOTES
Health Decision Maker has been checked with the patient      AI form was signed    Chief Complaint   Patient presents with    Sleep Problem     Patient is here to discuss new medication for sleeping issues. She was on trazodone and has taken herself off of it.        \"Have you been to the ER, urgent care clinic since your last visit?  Hospitalized since your last visit?\"    NO    “Have you seen or consulted any other health care providers outside of [facility] since your last visit?”    NO      There were no vitals filed for this visit.   Depression: Not at risk (6/9/2025)    PHQ-2     PHQ-2 Score: 0              Click Here for Release of Records Request    Chart reviewed: immunizations are documented.   Immunization History   Administered Date(s) Administered    COVID-19, MODERNA BLUE border, Primary or Immunocompromised, (age 12y+), IM, 100 mcg/0.5mL 01/07/2021, 02/04/2021    Influenza Trivalent 10/13/2013, 09/30/2014, 09/30/2015, 10/01/2016, 09/01/2017, 10/02/2019    Influenza Virus Vaccine 10/27/2021    Influenza, FLUCELVAX, (age 6 mo+), MDCK, Quadv PF, 0.5mL 10/30/2020    TDaP, ADACEL (age 10y-64y), BOOSTRIX (age 10y+), IM, 0.5mL 04/24/2015    Zoster Recombinant (Shingrix) 09/11/2019, 06/10/2020      
Discussed amitriptyline, Cymbalta, hydroxyzine, and Zanaflex  - Explained benefits and risks of each medication  - Prescribed tizanidine 2 mg at bedtime as needed, 90 tablets  - Disucssed this med can cause sedation, should not drink or drive after taking  - States this helped with her scoliosis pain and feels this is the root cause of her insomnia  - Understands this can be habit forming if taken everyday and there is risk of withdrawal with abrupt cessation mauricio higher doses   - Advised daily stool softener to prevent constipation  - If ineffective, will consider Ambien therapy       I have discussed the diagnosis with the patient and the intended plan as seen in the above orders. The patient has received an after-visit summary and questions were answered concerning future plans.  I have discussed medication side effects and warnings with the patient as well.    The patient (or guardian, if applicable) and other individuals in attendance with the patient were advised that Artificial Intelligence will be utilized during this visit to record and process the conversation to generate a clinical note. The patient (or guardian, if applicable) and other individuals in attendance at the appointment consented to the use of AI, including the recording.       Toro Soriano DO

## 2025-06-19 RX ORDER — EFINACONAZOLE 100 MG/ML
SOLUTION TOPICAL
Qty: 8 ML | Refills: 2 | OUTPATIENT
Start: 2025-06-19

## 2025-06-20 RX ORDER — EFINACONAZOLE 100 MG/ML
SOLUTION TOPICAL
Qty: 4 ML | Refills: 1 | Status: SHIPPED | OUTPATIENT
Start: 2025-06-20